# Patient Record
Sex: MALE | Race: WHITE | Employment: FULL TIME
[De-identification: names, ages, dates, MRNs, and addresses within clinical notes are randomized per-mention and may not be internally consistent; named-entity substitution may affect disease eponyms.]

---

## 2017-01-18 ENCOUNTER — OFFICE VISIT (OUTPATIENT)
Dept: FAMILY MEDICINE CLINIC | Facility: CLINIC | Age: 22
End: 2017-01-18

## 2017-01-18 VITALS
WEIGHT: 188 LBS | SYSTOLIC BLOOD PRESSURE: 116 MMHG | RESPIRATION RATE: 14 BRPM | BODY MASS INDEX: 27.85 KG/M2 | HEART RATE: 72 BPM | DIASTOLIC BLOOD PRESSURE: 68 MMHG | HEIGHT: 69 IN

## 2017-01-18 DIAGNOSIS — F33.9 EPISODE OF RECURRENT MAJOR DEPRESSIVE DISORDER, UNSPECIFIED DEPRESSION EPISODE SEVERITY (HCC): Primary | ICD-10-CM

## 2017-01-18 DIAGNOSIS — F41.0 PANIC ATTACKS: ICD-10-CM

## 2017-01-18 DIAGNOSIS — F12.90 MARIJUANA USE: ICD-10-CM

## 2017-01-18 PROCEDURE — 99213 OFFICE O/P EST LOW 20 MIN: CPT | Performed by: FAMILY MEDICINE

## 2017-01-18 RX ORDER — BUSPIRONE HYDROCHLORIDE 15 MG/1
15 TABLET ORAL 3 TIMES DAILY
Qty: 90 TABLET | Refills: 5 | Status: SHIPPED | OUTPATIENT
Start: 2017-01-18 | End: 2017-09-27 | Stop reason: SDUPTHER

## 2017-01-18 RX ORDER — CITALOPRAM 40 MG/1
40 TABLET ORAL DAILY
Qty: 30 TABLET | Refills: 5 | Status: SHIPPED | OUTPATIENT
Start: 2017-01-18 | End: 2017-09-27 | Stop reason: SDUPTHER

## 2017-01-18 ASSESSMENT — ENCOUNTER SYMPTOMS
ABDOMINAL PAIN: 0
GASTROINTESTINAL NEGATIVE: 1
VOMITING: 0
SORE THROAT: 0
CONSTIPATION: 0
EYE REDNESS: 0
APNEA: 0
DIARRHEA: 0
ABDOMINAL DISTENTION: 0
EYE DISCHARGE: 0
WHEEZING: 0
EYE ITCHING: 0
RHINORRHEA: 0
EYE PAIN: 0
RESPIRATORY NEGATIVE: 1
PHOTOPHOBIA: 0
BACK PAIN: 0
ALLERGIC/IMMUNOLOGIC NEGATIVE: 1
NAUSEA: 0
VOICE CHANGE: 0
FACIAL SWELLING: 0
EYES NEGATIVE: 1
COLOR CHANGE: 0
CHEST TIGHTNESS: 0
SHORTNESS OF BREATH: 0
COUGH: 0
CHOKING: 0
RECTAL PAIN: 0
BLOOD IN STOOL: 0
ANAL BLEEDING: 0
TROUBLE SWALLOWING: 0
SINUS PRESSURE: 0
STRIDOR: 0

## 2017-09-27 ENCOUNTER — OFFICE VISIT (OUTPATIENT)
Dept: FAMILY MEDICINE CLINIC | Age: 22
End: 2017-09-27

## 2017-09-27 VITALS
HEIGHT: 69 IN | SYSTOLIC BLOOD PRESSURE: 126 MMHG | BODY MASS INDEX: 30.66 KG/M2 | DIASTOLIC BLOOD PRESSURE: 74 MMHG | WEIGHT: 207 LBS | HEART RATE: 75 BPM | RESPIRATION RATE: 18 BRPM

## 2017-09-27 DIAGNOSIS — F41.0 PANIC ATTACKS: ICD-10-CM

## 2017-09-27 DIAGNOSIS — F33.9 EPISODE OF RECURRENT MAJOR DEPRESSIVE DISORDER, UNSPECIFIED DEPRESSION EPISODE SEVERITY (HCC): Primary | ICD-10-CM

## 2017-09-27 PROCEDURE — 99213 OFFICE O/P EST LOW 20 MIN: CPT | Performed by: FAMILY MEDICINE

## 2017-09-27 RX ORDER — BUSPIRONE HYDROCHLORIDE 15 MG/1
15 TABLET ORAL 3 TIMES DAILY
Qty: 270 TABLET | Refills: 2 | Status: SHIPPED | OUTPATIENT
Start: 2017-09-27 | End: 2020-12-08

## 2017-09-27 RX ORDER — CITALOPRAM 40 MG/1
40 TABLET ORAL DAILY
Qty: 90 TABLET | Refills: 2 | Status: SHIPPED | OUTPATIENT
Start: 2017-09-27 | End: 2020-12-08 | Stop reason: SDUPTHER

## 2017-09-28 ASSESSMENT — ENCOUNTER SYMPTOMS
RHINORRHEA: 0
ALLERGIC/IMMUNOLOGIC NEGATIVE: 1
FACIAL SWELLING: 0
EYE REDNESS: 0
COUGH: 0
STRIDOR: 0
EYE DISCHARGE: 0
EYES NEGATIVE: 1
DIARRHEA: 0
WHEEZING: 0
COLOR CHANGE: 0
PHOTOPHOBIA: 0
ABDOMINAL PAIN: 0
VOMITING: 0
CHEST TIGHTNESS: 0
SINUS PRESSURE: 0
BACK PAIN: 0
SHORTNESS OF BREATH: 0
EYE ITCHING: 0
EYE PAIN: 0
CHOKING: 0
NAUSEA: 0
VOICE CHANGE: 0
ABDOMINAL DISTENTION: 0
GASTROINTESTINAL NEGATIVE: 1
CONSTIPATION: 0
ANAL BLEEDING: 0
RESPIRATORY NEGATIVE: 1
BLOOD IN STOOL: 0
SORE THROAT: 0
APNEA: 0
TROUBLE SWALLOWING: 0
RECTAL PAIN: 0

## 2020-06-25 ENCOUNTER — OFFICE VISIT (OUTPATIENT)
Dept: SURGERY | Age: 25
End: 2020-06-25
Payer: MEDICAID

## 2020-06-25 ENCOUNTER — OFFICE VISIT (OUTPATIENT)
Dept: FAMILY MEDICINE CLINIC | Age: 25
End: 2020-06-25
Payer: MEDICAID

## 2020-06-25 VITALS
WEIGHT: 219 LBS | BODY MASS INDEX: 32.44 KG/M2 | TEMPERATURE: 96.4 F | SYSTOLIC BLOOD PRESSURE: 150 MMHG | HEIGHT: 69 IN | RESPIRATION RATE: 16 BRPM | HEART RATE: 100 BPM | DIASTOLIC BLOOD PRESSURE: 98 MMHG

## 2020-06-25 VITALS
BODY MASS INDEX: 33.41 KG/M2 | RESPIRATION RATE: 18 BRPM | SYSTOLIC BLOOD PRESSURE: 137 MMHG | DIASTOLIC BLOOD PRESSURE: 96 MMHG | HEIGHT: 69 IN | HEART RATE: 93 BPM | WEIGHT: 225.6 LBS | OXYGEN SATURATION: 98 %

## 2020-06-25 PROCEDURE — 4004F PT TOBACCO SCREEN RCVD TLK: CPT | Performed by: SURGERY

## 2020-06-25 PROCEDURE — 4004F PT TOBACCO SCREEN RCVD TLK: CPT | Performed by: INTERNAL MEDICINE

## 2020-06-25 PROCEDURE — G8427 DOCREV CUR MEDS BY ELIG CLIN: HCPCS | Performed by: SURGERY

## 2020-06-25 PROCEDURE — 96160 PT-FOCUSED HLTH RISK ASSMT: CPT | Performed by: INTERNAL MEDICINE

## 2020-06-25 PROCEDURE — G8417 CALC BMI ABV UP PARAM F/U: HCPCS | Performed by: INTERNAL MEDICINE

## 2020-06-25 PROCEDURE — G8427 DOCREV CUR MEDS BY ELIG CLIN: HCPCS | Performed by: INTERNAL MEDICINE

## 2020-06-25 PROCEDURE — G8417 CALC BMI ABV UP PARAM F/U: HCPCS | Performed by: SURGERY

## 2020-06-25 PROCEDURE — 99204 OFFICE O/P NEW MOD 45 MIN: CPT | Performed by: INTERNAL MEDICINE

## 2020-06-25 PROCEDURE — 99202 OFFICE O/P NEW SF 15 MIN: CPT | Performed by: SURGERY

## 2020-06-25 RX ORDER — OMEPRAZOLE 20 MG/1
20 CAPSULE, DELAYED RELEASE ORAL
Qty: 180 CAPSULE | Refills: 1 | Status: SHIPPED | OUTPATIENT
Start: 2020-06-25 | End: 2021-04-26 | Stop reason: SDUPTHER

## 2020-06-25 RX ORDER — SUCRALFATE 1 G/1
1 TABLET ORAL 4 TIMES DAILY
Qty: 120 TABLET | Refills: 3 | Status: SHIPPED | OUTPATIENT
Start: 2020-06-25 | End: 2020-12-08 | Stop reason: ALTCHOICE

## 2020-06-25 RX ORDER — LISINOPRIL 5 MG/1
5 TABLET ORAL DAILY
Qty: 30 TABLET | Refills: 3 | Status: SHIPPED | OUTPATIENT
Start: 2020-06-25 | End: 2020-12-08 | Stop reason: ALTCHOICE

## 2020-06-25 SDOH — ECONOMIC STABILITY: INCOME INSECURITY: HOW HARD IS IT FOR YOU TO PAY FOR THE VERY BASICS LIKE FOOD, HOUSING, MEDICAL CARE, AND HEATING?: NOT HARD AT ALL

## 2020-06-25 SDOH — ECONOMIC STABILITY: TRANSPORTATION INSECURITY
IN THE PAST 12 MONTHS, HAS THE LACK OF TRANSPORTATION KEPT YOU FROM MEDICAL APPOINTMENTS OR FROM GETTING MEDICATIONS?: NO

## 2020-06-25 SDOH — ECONOMIC STABILITY: TRANSPORTATION INSECURITY
IN THE PAST 12 MONTHS, HAS LACK OF TRANSPORTATION KEPT YOU FROM MEETINGS, WORK, OR FROM GETTING THINGS NEEDED FOR DAILY LIVING?: NO

## 2020-06-25 SDOH — ECONOMIC STABILITY: FOOD INSECURITY: WITHIN THE PAST 12 MONTHS, THE FOOD YOU BOUGHT JUST DIDN'T LAST AND YOU DIDN'T HAVE MONEY TO GET MORE.: NEVER TRUE

## 2020-06-25 SDOH — ECONOMIC STABILITY: FOOD INSECURITY: WITHIN THE PAST 12 MONTHS, YOU WORRIED THAT YOUR FOOD WOULD RUN OUT BEFORE YOU GOT MONEY TO BUY MORE.: NEVER TRUE

## 2020-06-25 ASSESSMENT — ENCOUNTER SYMPTOMS
CONSTIPATION: 0
SINUS PRESSURE: 0
DIARRHEA: 1
SINUS PAIN: 0
WHEEZING: 0
SORE THROAT: 0
FLATUS: 1
BLOOD IN STOOL: 0
COUGH: 0
RHINORRHEA: 0
BLURRED VISION: 0
ALLERGIC/IMMUNOLOGIC NEGATIVE: 1
NAUSEA: 1
ABDOMINAL PAIN: 1
VOMITING: 1
BELCHING: 0
SHORTNESS OF BREATH: 0

## 2020-06-25 ASSESSMENT — PATIENT HEALTH QUESTIONNAIRE - PHQ9
6. FEELING BAD ABOUT YOURSELF - OR THAT YOU ARE A FAILURE OR HAVE LET YOURSELF OR YOUR FAMILY DOWN: 0
8. MOVING OR SPEAKING SO SLOWLY THAT OTHER PEOPLE COULD HAVE NOTICED. OR THE OPPOSITE, BEING SO FIGETY OR RESTLESS THAT YOU HAVE BEEN MOVING AROUND A LOT MORE THAN USUAL: 0
SUM OF ALL RESPONSES TO PHQ QUESTIONS 1-9: 8
7. TROUBLE CONCENTRATING ON THINGS, SUCH AS READING THE NEWSPAPER OR WATCHING TELEVISION: 0
10. IF YOU CHECKED OFF ANY PROBLEMS, HOW DIFFICULT HAVE THESE PROBLEMS MADE IT FOR YOU TO DO YOUR WORK, TAKE CARE OF THINGS AT HOME, OR GET ALONG WITH OTHER PEOPLE: 3
4. FEELING TIRED OR HAVING LITTLE ENERGY: 0
SUM OF ALL RESPONSES TO PHQ9 QUESTIONS 1 & 2: 3
3. TROUBLE FALLING OR STAYING ASLEEP: 2
1. LITTLE INTEREST OR PLEASURE IN DOING THINGS: 2
2. FEELING DOWN, DEPRESSED OR HOPELESS: 1
9. THOUGHTS THAT YOU WOULD BE BETTER OFF DEAD, OR OF HURTING YOURSELF: 0
5. POOR APPETITE OR OVEREATING: 3
SUM OF ALL RESPONSES TO PHQ QUESTIONS 1-9: 8

## 2020-06-25 NOTE — LETTER
[address] [id number]  Phone: [phone]  Fax: [phone]    Troy Ya MD        July 18, 2020     Eva Andrea MD  [facility] [place] [id number]    Patient: Nina Coelho  MR Number: [id number]  YOB: 1995  Date of Visit: 6/25/2020    Dear Dr. Eva Andrea:    Thank you for the request for consultation for Mercedez Powers to me for the evaluation of epigastric pain, nausea. Below are the relevant portions of my assessment and plan of care. ASSESSMENT      Diagnosis Orders   1. Epigastric pain      2. Nausea      3. BMI 32.0-32.9,adult      4. Tobacco abuse      5. Marijuana use      6. History of renal calculi           PLAN     Discussed at length with Mr Marquez Huston his long history of heartburn. It was previously well managed with Nexium. He has just resumed PPI. Rx Carafate provided. I expect his symptoms to resolve. However, if his symptoms worsen, fail to resolve, or if new symptoms develop, he agrees to follow up with me to schedule EGD. Discussed importance of complete tobacco cessation, limiting marijuana use, healthy high fiber low fat diet, etc.    If you have questions, please do not hesitate to call me. I look forward to following Elan Talbert along with you.     Sincerely,        Troy Ya MD

## 2020-06-25 NOTE — PROGRESS NOTES
treatments include nothing. There is no history of kidney disease, CAD/MI, CVA, heart failure or left ventricular hypertrophy. Past Medical History:     Past Medical History:   Diagnosis Date    Anxiety     Depression     Kidney stone       Reviewed all health maintenance requirements and orderedappropriate tests  There are no preventive care reminders to display for this patient. Past Surgical History:     Past Surgical History:   Procedure Laterality Date    PILONIDAL CYST EXCISION      8 mos ago 2015        Medications:       Prior to Admission medications    Medication Sig Start Date End Date Taking? Authorizing Provider   omeprazole (PRILOSEC) 20 MG delayed release capsule Take 1 capsule by mouth 2 times daily (before meals) 6/25/20  Yes Meggan Davis MD   lisinopril (PRINIVIL;ZESTRIL) 5 MG tablet Take 1 tablet by mouth daily 6/25/20  Yes Meggan Davis MD   busPIRone (BUSPAR) 15 MG tablet Take 1 tablet by mouth 3 times daily  Patient not taking: Reported on 6/25/2020 9/27/17   Gab Springer MD   citalopram (CELEXA) 40 MG tablet Take 1 tablet by mouth daily  Patient not taking: Reported on 6/25/2020 9/27/17   Gab Springer MD        Allergies:       Patient has no known allergies. Social History:     Tobacco: reports that he has been smoking e-cigarettes and cigarettes. He has a 2.50 pack-year smoking history. He has never used smokeless tobacco.  Alcohol:      reports current alcohol use of about 5.0 standard drinks of alcohol per week. Drug Use:  reports no history of drug use. Family History:     History reviewed. No pertinent family history. Review of Systems:         Review of Systems   Constitutional: Negative for activity change, appetite change, fatigue, fever and unexpected weight change. HENT: Negative for congestion, ear discharge, ear pain, rhinorrhea, sinus pressure, sinus pain and sore throat. Eyes: Negative for blurred vision and visual disturbance. sounds. No wheezing or rales. Abdominal:      General: Bowel sounds are normal. There is no distension. Palpations: Abdomen is soft. There is no mass. Tenderness: There is abdominal tenderness (epigastric pain). There is no right CVA tenderness, left CVA tenderness or rebound. Hernia: No hernia is present. Musculoskeletal: Normal range of motion. Right lower leg: No edema. Left lower leg: No edema. Lymphadenopathy:      Cervical: No cervical adenopathy. Skin:     General: Skin is warm and dry. Coloration: Skin is not jaundiced or pale. Findings: No rash. Neurological:      General: No focal deficit present. Mental Status: He is alert and oriented to person, place, and time. Cranial Nerves: No cranial nerve deficit. Psychiatric:         Mood and Affect: Mood normal.         Behavior: Behavior normal.         Thought Content: Thought content normal.         Judgment: Judgment normal.               Data:     Lab Results   Component Value Date     06/01/2016    K 3.7 06/01/2016     06/01/2016    CO2 22 06/01/2016    BUN 14 06/01/2016    CREATININE 0.83 06/01/2016    GLUCOSE 101 06/01/2016    PROT 7.9 05/10/2015    LABALBU 5.2 05/10/2015    BILITOT 0.20 05/10/2015    ALKPHOS 73 05/10/2015    AST 16 05/10/2015    ALT 13 05/10/2015     Lab Results   Component Value Date    WBC 10.6 06/01/2016    RBC 4.76 06/01/2016    HGB 14.7 06/01/2016    HCT 43.8 06/01/2016    MCV 92.0 06/01/2016    MCH 30.9 06/01/2016    MCHC 33.6 06/01/2016    RDW 12.9 06/01/2016     06/01/2016    MPV NOT REPORTED 06/01/2016     No results found for: TSH  Lab Results   Component Value Date    LABA1C 5.1 05/08/2016          Assessment & Plan        Diagnosis Orders   1. Essential hypertension   Will start on Lisinopril 5 mg/day. Follow up in 4 weeks omeprazole (PRILOSEC) 20 MG delayed release capsule    lisinopril (PRINIVIL;ZESTRIL) 5 MG tablet   2.  Gastroesophageal reflux

## 2020-07-18 ASSESSMENT — ENCOUNTER SYMPTOMS
SHORTNESS OF BREATH: 0
SORE THROAT: 0
CHOKING: 0
TROUBLE SWALLOWING: 0
BLOOD IN STOOL: 0
VOMITING: 0
NAUSEA: 1
COUGH: 0
BACK PAIN: 1
ABDOMINAL PAIN: 1

## 2020-07-18 NOTE — PROGRESS NOTES
Kayleen Rodriguez MD  General Surgery, Endoscopy  Chief Medical Officer    [facility]  [address] [place] [address]  Dept: [phone]  Fax: 13 [address]  Chief Complaint   Patient presents with    New Patient    Abdominal Pain    Gastroesophageal Reflux     Patient requested appointment for abdominal pain occuring over the past month. Patient also complains of nausea, loose stools and gerd. Dr Connie Cardenas wrote for omeprazole today. HPI    Mr Sallie Cloud is a 32 yo WM kindly referred to me by Dr Connie Cardenas for evaluation of epigastric pain, nausea, with reflux symptoms. He has had heartburn for years. This was well controlled by Nexium over the last year, but he stopped taking it 2 months ago. At that time, symptoms returned. Nauseated, without emesis. Weight 219 lbs, stable. BMI 32.3. Daily BM's, formed and brown. History R ureteral stone in 2016, anxiety, depression. No prior GI surgery nor endoscopy. No family history of GI malignancy to his knowledge. He is decreasing tobacco use, less than 1 ppd. Occasional marijuana. Review of Systems   Constitutional: Negative for activity change, appetite change, chills, fever and unexpected weight change. HENT: Negative for nosebleeds, sneezing, sore throat and trouble swallowing. Eyes: Negative for visual disturbance. Respiratory: Negative for cough, choking and shortness of breath. Cardiovascular: Negative for chest pain, palpitations and leg swelling. Gastrointestinal: Positive for abdominal pain (epigastric, with GERD) and nausea. Negative for blood in stool and vomiting. Genitourinary: Negative for dysuria, flank pain and hematuria. Musculoskeletal: Positive for back pain. Negative for gait problem and myalgias. Allergic/Immunologic: Negative for immunocompromised state. Neurological: Positive for headaches. Negative for dizziness, seizures, syncope and weakness. Hematological: Does not bruise/bleed easily. Psychiatric/Behavioral: Negative for confusion and sleep disturbance. Past Medical History:   Diagnosis Date    Anxiety     Depression     Kidney stone        Past Surgical History:   Procedure Laterality Date    PILONIDAL CYST EXCISION      8 mos ago 2015       History reviewed. No pertinent family history. Allergies:  See list    Current Outpatient Medications   Medication Sig Dispense Refill    omeprazole (PRILOSEC) 20 MG delayed release capsule Take 1 capsule by mouth 2 times daily (before meals) 180 capsule 1    lisinopril (PRINIVIL;ZESTRIL) 5 MG tablet Take 1 tablet by mouth daily 30 tablet 3    sucralfate (CARAFATE) 1 GM tablet Take 1 tablet by mouth 4 times daily 120 tablet 3    busPIRone (BUSPAR) 15 MG tablet Take 1 tablet by mouth 3 times daily 270 tablet 2    citalopram (CELEXA) 40 MG tablet Take 1 tablet by mouth daily 90 tablet 2     No current facility-administered medications for this visit. Social History     Socioeconomic History    Marital status: Single     Spouse name: None    Number of children: None    Years of education: None    Highest education level: None   Occupational History    None   Social Needs    Financial resource strain: Not hard at all   Justin-Brandon insecurity     Worry: Never true     Inability: Never true    Transportation needs     Medical: No     Non-medical: No   Tobacco Use    Smoking status: Current Some Day Smoker     Packs/day: 0.50     Years: 5.00     Pack years: 2.50     Types: E-Cigarettes, Cigarettes    Smokeless tobacco: Never Used   Substance and Sexual Activity    Alcohol use:  Yes     Alcohol/week: 5.0 standard drinks     Types: 5 Glasses of wine per week     Comment: occasional    Drug use: No    Sexual activity: None   Lifestyle    Physical activity     Days per week: None     Minutes per session: None    Stress: None   Relationships    Social connections     Talks on phone: None Gets together: None     Attends Hoahaoism service: None     Active member of club or organization: None     Attends meetings of clubs or organizations: None     Relationship status: None    Intimate partner violence     Fear of current or ex partner: None     Emotionally abused: None     Physically abused: None     Forced sexual activity: None   Other Topics Concern    None   Social History Narrative    None       BP (!) 150/98 (Site: Right Lower Arm, Position: Sitting, Cuff Size: Medium Adult)   Pulse 100   Temp 96.4 °F (35.8 °C) (Infrared)   Resp 16   Ht 5' 9\" (1.753 m)   Wt 219 lb (99.3 kg)   BMI 32.34 kg/m²      Physical Exam  Vitals signs and nursing note reviewed. Constitutional:       Appearance: He is well-developed. HENT:      Head: Normocephalic and atraumatic. Eyes:      General: No scleral icterus. Conjunctiva/sclera: Conjunctivae normal.      Pupils: Pupils are equal, round, and reactive to light. Neck:      Musculoskeletal: Normal range of motion and neck supple. Vascular: No JVD. Trachea: No tracheal deviation. Cardiovascular:      Rate and Rhythm: Normal rate and regular rhythm. Pulmonary:      Effort: Pulmonary effort is normal. No respiratory distress. Breath sounds: Normal breath sounds. Chest:      Chest wall: No tenderness. Abdominal:      General: Bowel sounds are normal. There is no distension. Palpations: Abdomen is soft. There is no mass. Tenderness: There is no abdominal tenderness. There is no guarding or rebound. Musculoskeletal: Normal range of motion. Lymphadenopathy:      Cervical: No cervical adenopathy. Skin:     General: Skin is warm and dry. Findings: No erythema or rash. Neurological:      Mental Status: He is alert and oriented to person, place, and time. Cranial Nerves: No cranial nerve deficit. Psychiatric:         Behavior: Behavior normal.         Thought Content:  Thought content normal. findings in the    abdomen or pelvis.               ASSESSMENT     Diagnosis Orders   1. Epigastric pain     2. Nausea     3. BMI 32.0-32.9,adult     4. Tobacco abuse     5. Marijuana use     6. History of renal calculi         PLAN    Discussed at length with Mr Sallie Cloud his long history of heartburn. It was previously well managed with Nexium. He has just resumed PPI. Rx Carafate provided. I expect his symptoms to resolve. However, if his symptoms worsen, fail to resolve, or if new symptoms develop, he agrees to follow up with me to schedule EGD.   Discussed importance of complete tobacco cessation, limiting marijuana use, healthy high fiber low fat diet, etc.     Kayleen Rodriguez MD

## 2020-07-18 NOTE — PATIENT INSTRUCTIONS
scope in your mouth and toward the back of your throat. The doctor will tell you when to swallow. This helps the scope move down your throat. You will be able to breathe normally. The doctor will move the scope down your esophagus into your stomach. The doctor also may look at the duodenum. · If your doctor wants to take a sample of tissue for a biopsy, he or she may use small surgical tools, which are put into the scope, to cut off some tissue. You will not feel a biopsy, if one is taken. The doctor also can use the tools to stop bleeding or to do other treatments, if needed. · You will stay at the hospital or surgery center for 1 to 2 hours until the medicine you were given wears off. What happens after an upper GI endoscopy? · After the test, you may belch and feel bloated for a while. · You may have a tickling, dry throat or mouth. You may feel a bit hoarse, and you may have a mild sore throat. These symptoms may last several days. Throat lozenges and warm saltwater gargles can help relieve the throat symptoms. · Don't drive or operate machinery for 12 hours after the test.  · Your doctor will tell you when you can go back to your usual diet and activities. · Don't drink alcohol for 12 to 24 hours after the test.  When should you call your doctor? · You have questions or concerns. · You don't understand how to prepare for your procedure. · You become ill before the procedure (such as fever, flu, or a cold). · You need to reschedule or have changed your mind about having the procedure. Where can you learn more? Go to [email or web address] and sign in to your [employer] account. Enter P790 in the Pontis box to learn more about \"Upper GI Endoscopy: Before Your Procedure. \"     If you do not have an account, please click on the \"Sign Up Now\" link. Current as of: August 12, 2019               Content Version: 12.5  © 3974-2686 Healthwise, [facility].    Care instructions adapted under license by [facility] ([facility]). If you have questions about a medical condition or this instruction, always ask your healthcare professional. [address] any warranty or liability for your use of this information.

## 2020-12-08 ENCOUNTER — OFFICE VISIT (OUTPATIENT)
Dept: FAMILY MEDICINE CLINIC | Age: 25
End: 2020-12-08
Payer: MEDICAID

## 2020-12-08 VITALS
BODY MASS INDEX: 36.24 KG/M2 | DIASTOLIC BLOOD PRESSURE: 106 MMHG | OXYGEN SATURATION: 100 % | WEIGHT: 245.4 LBS | HEART RATE: 117 BPM | SYSTOLIC BLOOD PRESSURE: 159 MMHG | TEMPERATURE: 98 F

## 2020-12-08 PROBLEM — F41.9 ANXIETY: Status: ACTIVE | Noted: 2020-12-08

## 2020-12-08 PROBLEM — K21.9 GASTROESOPHAGEAL REFLUX DISEASE WITHOUT ESOPHAGITIS: Status: ACTIVE | Noted: 2020-12-08

## 2020-12-08 PROBLEM — F12.90 MARIJUANA USE: Status: RESOLVED | Noted: 2017-01-18 | Resolved: 2020-12-08

## 2020-12-08 PROBLEM — I10 ESSENTIAL HYPERTENSION: Status: ACTIVE | Noted: 2020-12-08

## 2020-12-08 PROCEDURE — G8427 DOCREV CUR MEDS BY ELIG CLIN: HCPCS | Performed by: INTERNAL MEDICINE

## 2020-12-08 PROCEDURE — G8484 FLU IMMUNIZE NO ADMIN: HCPCS | Performed by: INTERNAL MEDICINE

## 2020-12-08 PROCEDURE — G8417 CALC BMI ABV UP PARAM F/U: HCPCS | Performed by: INTERNAL MEDICINE

## 2020-12-08 PROCEDURE — 99214 OFFICE O/P EST MOD 30 MIN: CPT | Performed by: INTERNAL MEDICINE

## 2020-12-08 PROCEDURE — 4004F PT TOBACCO SCREEN RCVD TLK: CPT | Performed by: INTERNAL MEDICINE

## 2020-12-08 RX ORDER — CITALOPRAM 40 MG/1
40 TABLET ORAL DAILY
Qty: 90 TABLET | Refills: 2 | Status: SHIPPED | OUTPATIENT
Start: 2020-12-08 | End: 2021-08-02

## 2020-12-08 ASSESSMENT — ENCOUNTER SYMPTOMS
BACK PAIN: 0
CONSTIPATION: 0
COUGH: 0
SHORTNESS OF BREATH: 1
SORE THROAT: 0
BLOOD IN STOOL: 0
NAUSEA: 0
ALLERGIC/IMMUNOLOGIC NEGATIVE: 1
ABDOMINAL PAIN: 0
WHEEZING: 0

## 2020-12-08 ASSESSMENT — COPD QUESTIONNAIRES: COPD: 0

## 2020-12-08 NOTE — PATIENT INSTRUCTIONS
SURVEY:    You may be receiving a survey from [employer] regarding your visit today. Please complete the survey to enable us to provide the highest quality of care to you and your family. If you cannot score us a very good on any question, please call the office to discuss how we could of made your experience a very good one. Thank you. You may be receiving a survey from [employer] regarding your visit today. You may get this in the mail, through your MyChart or in your email. Please complete the survey to enable us to provide the highest quality of care to you and your family. If you cannot score us as very good ( 5 Stars) on any question, please feel free to call the office to discuss how we could have made your experience exceptional.     Thank You!       MD Ivan Ramon, ROMINA Delgado, PCA

## 2020-12-08 NOTE — PROGRESS NOTES
8 mos ago 2015        Medications:       Prior to Admission medications    Medication Sig Start Date End Date Taking? Authorizing Provider   metoprolol tartrate (LOPRESSOR) 25 MG tablet Take 1 tablet by mouth 2 times daily 12/8/20  Yes Yvrose Hawk MD   citalopram (CELEXA) 40 MG tablet Take 1 tablet by mouth daily 12/8/20  Yes Yvrose Hawk MD   omeprazole (PRILOSEC) 20 MG delayed release capsule Take 1 capsule by mouth 2 times daily (before meals) 6/25/20   Yvrose Hawk MD        Allergies:       Patient has no known allergies. Social History:     Tobacco: reports that he has been smoking e-cigarettes and cigarettes. He has a 2.50 pack-year smoking history. He has never used smokeless tobacco.  Alcohol:      reports current alcohol use of about 5.0 standard drinks of alcohol per week. Drug Use:  reports no history of drug use. Family History:     No family history on file. Review of Systems:         Review of Systems   Constitutional: Positive for fatigue. Negative for activity change, appetite change and unexpected weight change. HENT: Negative for congestion, ear discharge, ear pain and sore throat. Eyes: Negative for visual disturbance. Respiratory: Positive for shortness of breath. Negative for cough and wheezing. Cardiovascular: Positive for chest pain and palpitations. Negative for leg swelling. Gastrointestinal: Negative for abdominal pain, blood in stool, constipation and nausea. Endocrine: Negative for cold intolerance, heat intolerance, polydipsia and polyuria. Genitourinary: Negative for difficulty urinating and dysuria. Musculoskeletal: Positive for neck pain. Negative for back pain and neck stiffness. Skin: Negative for rash. Allergic/Immunologic: Negative. Neurological: Positive for headaches. Negative for dizziness, tremors, syncope and weakness. Psychiatric/Behavioral: Positive for sleep disturbance. Negative for behavioral problems and dysphoric mood. The patient is nervous/anxious. Physical Exam:     Vitals:  BP (!) 159/106 (Site: Right Upper Arm, Position: Sitting, Cuff Size: Large Adult)   Pulse 117   Temp 98 °F (36.7 °C)   Wt 245 lb 6.4 oz (111.3 kg)   SpO2 100%   BMI 36.24 kg/m²       Physical Exam  Vitals signs reviewed. Constitutional:       General: He is not in acute distress. Appearance: He is well-developed. He is obese. He is not ill-appearing or diaphoretic. HENT:      Head: Normocephalic and atraumatic. Right Ear: Tympanic membrane, ear canal and external ear normal.      Left Ear: Tympanic membrane, ear canal and external ear normal.      Nose: Nose normal. No congestion. Mouth/Throat:      Mouth: Mucous membranes are moist.      Pharynx: Oropharynx is clear. No oropharyngeal exudate. Eyes:      General: No scleral icterus. Right eye: No discharge. Left eye: No discharge. Conjunctiva/sclera: Conjunctivae normal.   Neck:      Musculoskeletal: Normal range of motion. No muscular tenderness. Thyroid: No thyromegaly. Cardiovascular:      Rate and Rhythm: Regular rhythm. Tachycardia present. Pulses: Normal pulses. Heart sounds: Normal heart sounds. No murmur. Pulmonary:      Effort: Pulmonary effort is normal.      Breath sounds: Normal breath sounds. No wheezing or rales. Abdominal:      General: Bowel sounds are normal. There is no distension. Palpations: Abdomen is soft. There is no mass. Tenderness: There is no abdominal tenderness. Musculoskeletal: Normal range of motion. General: No tenderness. Right lower leg: No edema. Left lower leg: No edema. Lymphadenopathy:      Cervical: No cervical adenopathy. Skin:     General: Skin is warm and dry. Findings: No rash. Neurological:      General: No focal deficit present. Mental Status: He is alert and oriented to person, place, and time. Mental status is at baseline.    Psychiatric: Mood and Affect: Mood normal.         Behavior: Behavior normal.         Thought Content: Thought content normal.         Judgment: Judgment normal.               Data:     Lab Results   Component Value Date     06/01/2016    K 3.7 06/01/2016     06/01/2016    CO2 22 06/01/2016    BUN 14 06/01/2016    CREATININE 0.83 06/01/2016    GLUCOSE 101 06/01/2016    PROT 7.9 05/10/2015    LABALBU 5.2 05/10/2015    BILITOT 0.20 05/10/2015    ALKPHOS 73 05/10/2015    AST 16 05/10/2015    ALT 13 05/10/2015     Lab Results   Component Value Date    WBC 10.6 06/01/2016    RBC 4.76 06/01/2016    HGB 14.7 06/01/2016    HCT 43.8 06/01/2016    MCV 92.0 06/01/2016    MCH 30.9 06/01/2016    MCHC 33.6 06/01/2016    RDW 12.9 06/01/2016     06/01/2016    MPV NOT REPORTED 06/01/2016     No results found for: TSH  Lab Results   Component Value Date    LABA1C 5.1 05/08/2016          Assessment & Plan        Diagnosis Orders   1. Essential hypertension   BP uncontrolled, pt noncompliant with medical therapy. Will start on Lopressor 25 mg bid due to tachycardia and CP. Follow up in about 2 weeks metoprolol tartrate (LOPRESSOR) 25 MG tablet   2. Chest pain, unspecified type   Will check EKG, labs. Will consider stress test after EKG available. Comprehensive Metabolic Panel    Troponin I    EKG 12 lead    CBC Auto Differential   3. Anxiety   Will check TSH. Resume Celexa. Follow up in 2 weeks for reevaluation  citalopram (CELEXA) 40 MG tablet    TSH with Reflex                   Completed Refills   Requested Prescriptions     Signed Prescriptions Disp Refills    metoprolol tartrate (LOPRESSOR) 25 MG tablet 180 tablet 1     Sig: Take 1 tablet by mouth 2 times daily    citalopram (CELEXA) 40 MG tablet 90 tablet 2     Sig: Take 1 tablet by mouth daily     Return in about 2 weeks (around 12/22/2020) for HTN.      Orders Placed This Encounter   Medications    metoprolol tartrate (LOPRESSOR) 25 MG tablet     Sig: Take 1 (CELEXA) 40 MG tablet 90 tablet 2     Sig: Take 1 tablet by mouth daily

## 2020-12-10 ENCOUNTER — TELEPHONE (OUTPATIENT)
Dept: FAMILY MEDICINE CLINIC | Age: 25
End: 2020-12-10

## 2020-12-10 LAB
ALBUMIN SERPL-MCNC: 4.2 G/DL
ALP BLD-CCNC: 117 U/L
ALT SERPL-CCNC: 60 U/L
ANION GAP SERPL CALCULATED.3IONS-SCNC: 11 MMOL/L
AST SERPL-CCNC: 38 U/L
BASOPHILS ABSOLUTE: 0.03 /ΜL
BASOPHILS RELATIVE PERCENT: 0.3 %
BILIRUB SERPL-MCNC: 0.4 MG/DL (ref 0.1–1.4)
BUN BLDV-MCNC: 18 MG/DL
CALCIUM SERPL-MCNC: 9.5 MG/DL
CHLORIDE BLD-SCNC: 103 MMOL/L
CO2: 27 MMOL/L
CREAT SERPL-MCNC: 1.3 MG/DL
EOSINOPHILS ABSOLUTE: 0.08 /ΜL
EOSINOPHILS RELATIVE PERCENT: 0.8 %
GFR CALCULATED: 75
GLUCOSE BLD-MCNC: 94 MG/DL
HCT VFR BLD CALC: 39.9 % (ref 41–53)
HEMOGLOBIN: 14 G/DL (ref 13.5–17.5)
LYMPHOCYTES ABSOLUTE: 2.99 /ΜL
LYMPHOCYTES RELATIVE PERCENT: 29.7 %
MCH RBC QN AUTO: 30 PG
MCHC RBC AUTO-ENTMCNC: 35.1 G/DL
MCV RBC AUTO: 85.4 FL
MONOCYTES ABSOLUTE: 0.91 /ΜL
MONOCYTES RELATIVE PERCENT: 9 %
NEUTROPHILS ABSOLUTE: 6.06 /ΜL
NEUTROPHILS RELATIVE PERCENT: 60.1 %
PDW BLD-RTO: 11.9 %
PLATELET # BLD: 283 K/ΜL
PMV BLD AUTO: 11.6 FL
POTASSIUM SERPL-SCNC: 3.9 MMOL/L
RBC # BLD: 4.67 10^6/ΜL
SODIUM BLD-SCNC: 137 MMOL/L
TOTAL PROTEIN: 8.8
TROPONIN: <15
TSH SERPL DL<=0.05 MIU/L-ACNC: 1.58 UIU/ML
WBC # BLD: 10.08 10^3/ML

## 2020-12-11 ENCOUNTER — TELEPHONE (OUTPATIENT)
Dept: FAMILY MEDICINE CLINIC | Age: 25
End: 2020-12-11

## 2020-12-16 ENCOUNTER — TELEPHONE (OUTPATIENT)
Dept: OBGYN CLINIC | Age: 25
End: 2020-12-16

## 2021-04-26 ENCOUNTER — OFFICE VISIT (OUTPATIENT)
Dept: FAMILY MEDICINE CLINIC | Age: 26
End: 2021-04-26
Payer: MEDICAID

## 2021-04-26 VITALS
TEMPERATURE: 97.2 F | DIASTOLIC BLOOD PRESSURE: 90 MMHG | OXYGEN SATURATION: 99 % | HEART RATE: 109 BPM | WEIGHT: 211.4 LBS | SYSTOLIC BLOOD PRESSURE: 164 MMHG | BODY MASS INDEX: 31.22 KG/M2

## 2021-04-26 DIAGNOSIS — R10.10 PAIN OF UPPER ABDOMEN: Primary | ICD-10-CM

## 2021-04-26 DIAGNOSIS — I10 ESSENTIAL HYPERTENSION: ICD-10-CM

## 2021-04-26 DIAGNOSIS — Z13.31 POSITIVE DEPRESSION SCREENING: ICD-10-CM

## 2021-04-26 DIAGNOSIS — F41.9 ANXIETY: ICD-10-CM

## 2021-04-26 DIAGNOSIS — K21.9 GASTROESOPHAGEAL REFLUX DISEASE WITHOUT ESOPHAGITIS: ICD-10-CM

## 2021-04-26 PROCEDURE — G8427 DOCREV CUR MEDS BY ELIG CLIN: HCPCS | Performed by: INTERNAL MEDICINE

## 2021-04-26 PROCEDURE — G8417 CALC BMI ABV UP PARAM F/U: HCPCS | Performed by: INTERNAL MEDICINE

## 2021-04-26 PROCEDURE — G8431 POS CLIN DEPRES SCRN F/U DOC: HCPCS | Performed by: INTERNAL MEDICINE

## 2021-04-26 PROCEDURE — 4004F PT TOBACCO SCREEN RCVD TLK: CPT | Performed by: INTERNAL MEDICINE

## 2021-04-26 PROCEDURE — 99214 OFFICE O/P EST MOD 30 MIN: CPT | Performed by: INTERNAL MEDICINE

## 2021-04-26 RX ORDER — DICYCLOMINE HCL 20 MG
TABLET ORAL
COMMUNITY
Start: 2021-04-19 | End: 2021-07-19

## 2021-04-26 RX ORDER — SUCRALFATE ORAL 1 G/10ML
1 SUSPENSION ORAL
COMMUNITY
Start: 2021-04-23 | End: 2021-05-17

## 2021-04-26 RX ORDER — BUSPIRONE HYDROCHLORIDE 10 MG/1
10 TABLET ORAL 3 TIMES DAILY
Qty: 90 TABLET | Refills: 3 | Status: SHIPPED | OUTPATIENT
Start: 2021-04-26 | End: 2021-05-17 | Stop reason: DRUGHIGH

## 2021-04-26 RX ORDER — ONDANSETRON 4 MG/1
TABLET, ORALLY DISINTEGRATING ORAL
COMMUNITY
Start: 2021-04-24 | End: 2021-07-19

## 2021-04-26 RX ORDER — POTASSIUM & SODIUM PHOSPHATES POWDER PACK 280-160-250 MG 280-160-250 MG
PACK ORAL
COMMUNITY
Start: 2021-04-20 | End: 2021-07-19

## 2021-04-26 RX ORDER — OMEPRAZOLE 40 MG/1
CAPSULE, DELAYED RELEASE ORAL
COMMUNITY
Start: 2021-04-19 | End: 2021-04-26

## 2021-04-26 RX ORDER — PROMETHAZINE HYDROCHLORIDE 25 MG/1
TABLET ORAL
COMMUNITY
Start: 2021-04-19 | End: 2021-07-19

## 2021-04-26 RX ORDER — OMEPRAZOLE 20 MG/1
20 CAPSULE, DELAYED RELEASE ORAL
Qty: 180 CAPSULE | Refills: 1 | Status: SHIPPED | OUTPATIENT
Start: 2021-04-26 | End: 2021-10-22

## 2021-04-26 ASSESSMENT — ENCOUNTER SYMPTOMS
VOMITING: 1
ALLERGIC/IMMUNOLOGIC NEGATIVE: 1
RECTAL PAIN: 0
CONSTIPATION: 0
RHINORRHEA: 0
SHORTNESS OF BREATH: 0
BLURRED VISION: 0
DIARRHEA: 1
ABDOMINAL PAIN: 1
ANAL BLEEDING: 0
CHEST TIGHTNESS: 0
HEMATOCHEZIA: 0
SORE THROAT: 0
NAUSEA: 1
COUGH: 0
ABDOMINAL DISTENTION: 0
BLOOD IN STOOL: 0
WHEEZING: 0

## 2021-04-26 ASSESSMENT — PATIENT HEALTH QUESTIONNAIRE - PHQ9
1. LITTLE INTEREST OR PLEASURE IN DOING THINGS: 3
3. TROUBLE FALLING OR STAYING ASLEEP: 3
4. FEELING TIRED OR HAVING LITTLE ENERGY: 3
SUM OF ALL RESPONSES TO PHQ9 QUESTIONS 1 & 2: 6
9. THOUGHTS THAT YOU WOULD BE BETTER OFF DEAD, OR OF HURTING YOURSELF: 0
SUM OF ALL RESPONSES TO PHQ QUESTIONS 1-9: 24
10. IF YOU CHECKED OFF ANY PROBLEMS, HOW DIFFICULT HAVE THESE PROBLEMS MADE IT FOR YOU TO DO YOUR WORK, TAKE CARE OF THINGS AT HOME, OR GET ALONG WITH OTHER PEOPLE: 3

## 2021-04-26 ASSESSMENT — COLUMBIA-SUICIDE SEVERITY RATING SCALE - C-SSRS: 1. WITHIN THE PAST MONTH, HAVE YOU WISHED YOU WERE DEAD OR WISHED YOU COULD GO TO SLEEP AND NOT WAKE UP?: NO

## 2021-04-26 ASSESSMENT — CROHNS DISEASE ACTIVITY INDEX (CDAI): CDAI SCORE: 0

## 2021-04-26 NOTE — PROGRESS NOTES
HPI Notes    Name: Courtney Hyman  : 1995         Chief Complaint:     Chief Complaint   Patient presents with    Anxiety     Patient states he has increased anxiety since 20 and his medication is no longer working. Patient states anxiety has been getting worse    Hypertension     Patient states his blood pressure and pulse has been elevated lately, has been in ER for this issue. History of Present Illness:        Mr. Christiano Lin presents to office for evaluation of abdominal pain. He also would like to follow-up for hypertension and anxiety      He went to [facility] emergency room 3 times in the last 2 weeks complaining of abdominal pain. His work-up including CT scan abdomen and pelvis were unremarkable. It was felt that his pain was related to gastritis. Was advised to take omeprazole and follow low-fat diet, avoid spicy food. According to the patient his abdominal pain was associated with nausea, vomiting and diarrhea. Apparently started about 2 weeks ago after multiple family members had similar symptoms. He reports no blood in stool. He had no blood in vomitus. He does not drink alcohol. His tried to use marijuana twice to help with N/V and anxiety and it made it worse. Soraya Eagle His last ER visit to [facility] ER was on . CT scan of abdomen and pelvis with IV contrast was done, revealed no acute intra-abdominal process, unremarkable appendix, no focal consolidation at the lung bases. Labs were unremarkable except slightly low phosphorus level of 2.6. CBC was normal with no leukocytosis and no evidence of anemia. D-dimer was 0.33. LFTs were unremarkable with normal transaminase levels and normal alk phos level of 106. Lipase was also normal 111. Potassium was mildly low at 3.4. Renal function was normal.  His EKG revealed sinus tachycardia with incomplete RBBB and moderate LVH. Patient was discharged home with Carafate. States pain is persisting. He has been complaint with diet. Kavon Segal presents as a follow up on anxiety. Current medication for anxiety includes Celexa. Kavon Segal has been on this medication for many years  . The medication is  being tolerated well. Since starting the medication the symptoms of anxiety have controlled but now not helping. Dewey Kahn  is not in counciling. He tried Buspar in the past and it helped him . Wants to try again       Abdominal Pain  This is a new problem. The current episode started 1 to 4 weeks ago. The onset quality is sudden. The problem occurs constantly. The problem has been unchanged. The pain is located in the epigastric region. The pain is at a severity of 8/10. The quality of the pain is colicky and sharp. The abdominal pain radiates to the periumbilical region. Associated symptoms include diarrhea, nausea and vomiting. Pertinent negatives include no constipation, dysuria, fever, headaches, hematochezia, melena or myalgias. The pain is aggravated by movement. The pain is relieved by nothing. He has tried proton pump inhibitors for the symptoms. The treatment provided mild relief. Prior diagnostic workup includes CT scan. There is no history of abdominal surgery, Crohn's disease, pancreatitis, PUD or ulcerative colitis. Hypertension  This is a chronic problem. The current episode started more than 1 year ago. The problem has been gradually worsening since onset. The problem is uncontrolled. Pertinent negatives include no blurred vision, chest pain, headaches, malaise/fatigue, palpitations, peripheral edema or shortness of breath. There are no associated agents to hypertension. Risk factors for coronary artery disease include obesity and male gender. Past treatments include beta blockers. There are no compliance problems. There is no history of kidney disease, CAD/MI or CVA.            Past Medical History:     Past Medical History:   Diagnosis Date    Anxiety     Depression     Kidney stone       Reviewed all health maintenance requirements and orderedappropriate tests  Health Maintenance Due   Topic Date Due    Hepatitis C screen  Never done    Varicella vaccine (1 of 2 - 2-dose childhood series) Never done    COVID-19 Vaccine (1) Never done       Past Surgical History:     Past Surgical History:   Procedure Laterality Date    PILONIDAL CYST EXCISION      8 mos ago 2015        Medications:       Prior to Admission medications    Medication Sig Start Date End Date Taking? Authorizing Provider   omeprazole (PRILOSEC) 20 MG delayed release capsule Take 1 capsule by mouth 2 times daily (before meals) 4/26/21  Yes Maryann Blake MD   busPIRone (BUSPAR) 10 MG tablet Take 1 tablet by mouth 3 times daily 4/26/21 5/26/21 Yes Maryann Blake MD   metoprolol tartrate (LOPRESSOR) 25 MG tablet Take 1 tablet by mouth 2 times daily 4/26/21  Yes Maryann Blake MD   citalopram (CELEXA) 40 MG tablet Take 1 tablet by mouth daily 12/8/20  Yes Maryann Blake MD   sucralfate (CARAFATE) 1 GM/10ML suspension Take 1 g by mouth 4/23/21 5/3/21  Historical Provider, MD Maryann Blake [phone] [facility] 21 PACK  4/20/21   Historical Provider, MD   ondansetron (ZOFRAN-ODT) 4 MG disintegrating tablet  4/24/21   Historical Provider, MD   promethazine (PHENERGAN) 25 MG tablet  4/19/21   Historical Provider, MD   dicyclomine (BENTYL) 20 MG tablet  4/19/21   Historical Provider, MD        Allergies:       Patient has no known allergies. Social History:     Tobacco: reports that he has been smoking e-cigarettes and cigarettes. He has a 2.50 pack-year smoking history. He has never used smokeless tobacco.  Alcohol:      reports current alcohol use of about 5.0 standard drinks of alcohol per week. Drug Use:  reports no history of drug use. Family History:     History reviewed. No pertinent family history.     Review of Systems:         Review of Systems   Constitutional: Negative for activity change, appetite change, fatigue, fever, malaise/fatigue and unexpected weight change. HENT: Negative for congestion, ear discharge, ear pain, rhinorrhea and sore throat. Eyes: Negative for blurred vision and visual disturbance. Respiratory: Negative for cough, chest tightness, shortness of breath and wheezing. Cardiovascular: Negative for chest pain, palpitations and leg swelling. Gastrointestinal: Positive for abdominal pain, diarrhea, nausea and vomiting. Negative for abdominal distention, anal bleeding, blood in stool, constipation, hematochezia, melena and rectal pain. Endocrine: Negative for cold intolerance, heat intolerance, polydipsia and polyuria. Genitourinary: Negative for difficulty urinating and dysuria. Musculoskeletal: Negative. Negative for myalgias. Skin: Negative for rash. Allergic/Immunologic: Negative. Neurological: Negative for dizziness, weakness and headaches. Psychiatric/Behavioral: Negative for behavioral problems, dysphoric mood, self-injury, sleep disturbance and suicidal ideas. The patient is nervous/anxious. Physical Exam:     Vitals:  BP (!) 164/90 (Site: Right Upper Arm, Position: Sitting, Cuff Size: Medium Adult)   Pulse 109   Temp 97.2 °F (36.2 °C)   Wt 211 lb 6.4 oz (95.9 kg)   SpO2 99%   BMI 31.22 kg/m²       Physical Exam  Vitals signs reviewed. Constitutional:       General: He is not in acute distress. Appearance: He is well-developed. He is obese. HENT:      Head: Normocephalic and atraumatic. Mouth/Throat:      Mouth: Mucous membranes are moist.      Pharynx: Oropharynx is clear. Eyes:      General: No scleral icterus. Right eye: No discharge. Left eye: No discharge. Conjunctiva/sclera: Conjunctivae normal.   Neck:      Thyroid: No thyromegaly. Cardiovascular:      Rate and Rhythm: Normal rate and regular rhythm. Heart sounds: Normal heart sounds. No murmur. Pulmonary:      Effort: Pulmonary effort is normal.      Breath sounds: Normal breath sounds. No wheezing or rales. Abdominal:      General: Bowel sounds are normal. There is no distension. Palpations: Abdomen is soft. There is no mass. Tenderness: There is no abdominal tenderness. There is no right CVA tenderness or left CVA tenderness. Hernia: No hernia is present. Musculoskeletal: Normal range of motion. Right lower leg: No edema. Left lower leg: No edema. Lymphadenopathy:      Cervical: No cervical adenopathy. Skin:     General: Skin is warm and dry. Coloration: Skin is not jaundiced or pale. Findings: No rash. Neurological:      General: No focal deficit present. Mental Status: He is alert and oriented to person, place, and time. Psychiatric:         Mood and Affect: Mood normal.         Behavior: Behavior normal.         Judgment: Judgment normal.      Comments: Anxious looking young man               Data:     Lab Results   Component Value Date     12/10/2020    K 3.9 12/10/2020     12/10/2020    CO2 27 12/10/2020    BUN 18 12/10/2020    CREATININE 1.30 12/10/2020    GLUCOSE 94 12/10/2020    PROT 7.9 05/10/2015    LABALBU 4.2 12/10/2020    BILITOT 0.4 12/10/2020    ALKPHOS 117 12/10/2020    AST 38 12/10/2020    ALT 60 12/10/2020     Lab Results   Component Value Date    WBC 10.08 12/10/2020    RBC 4.67 12/10/2020    HGB 14.0 12/10/2020    HCT 39.9 12/10/2020    MCV 85.4 12/10/2020    MCH 30.0 12/10/2020    MCHC 35.1 12/10/2020    RDW 11.9 12/10/2020     12/10/2020    MPV 11.6 12/10/2020     Lab Results   Component Value Date    TSH 1.58 12/10/2020     Lab Results   Component Value Date    LABA1C 5.1 05/08/2016          Assessment & Plan        Diagnosis Orders   1. Pain of upper abdomen   Patient continues to have persisting epigastric pain despite taking omeprazole and Carafate and trying to be more compliant with diet. Has associated nausea and occasional vomiting.   Has no history of gastric ulcers or GI problems in the past. Will refer to general surgery Tim Baer for evaluation for possible EGD Lionel Grullon MD, General Surgery, Lamberto Millan   2. Gastroesophageal reflux disease without esophagitis   Recommended to continue on PPIs and Carafate omeprazole (PRILOSEC) 20 MG delayed release capsule   3. Essential hypertension   Blood pressure is uncontrolled. According to the patient his pressure is elevated due to anxiety. Will address anxiety and follow-up with him in 2 weeks for a recheck omeprazole (PRILOSEC) 20 MG delayed release capsule    metoprolol tartrate (LOPRESSOR) 25 MG tablet   4. Anxiety   Continue on Celexa. Prescribed BuSpar 10 mg 3 times daily. Patient inquired about benzodiazepines and I recommended against it due to potential addiction. Patient expressed his understanding. He will try BuSpar and will follow-up with me in 2 weeks. busPIRone (BUSPAR) 10 MG tablet   5. Positive depression screening   Patient denies feeling depressed. He is rather anxious and will prescribe BuSpar 10 mg 3 times daily with plan to follow-up in 2 weeks Positive Screen for Clinical Depression with a Documented Follow-up Plan                    Completed Refills   Requested Prescriptions     Signed Prescriptions Disp Refills    omeprazole (PRILOSEC) 20 MG delayed release capsule 180 capsule 1     Sig: Take 1 capsule by mouth 2 times daily (before meals)    busPIRone (BUSPAR) 10 MG tablet 90 tablet 3     Sig: Take 1 tablet by mouth 3 times daily    metoprolol tartrate (LOPRESSOR) 25 MG tablet 180 tablet 1     Sig: Take 1 tablet by mouth 2 times daily     Return in about 2 weeks (around 5/10/2021) for HTN, anxiety.      Orders Placed This Encounter   Medications    omeprazole (PRILOSEC) 20 MG delayed release capsule     Sig: Take 1 capsule by mouth 2 times daily (before meals)     Dispense:  180 capsule     Refill:  1    busPIRone (BUSPAR) 10 MG tablet     Sig: Take 1 tablet by mouth 3 times daily     Dispense:  90 tablet Refill:  3    metoprolol tartrate (LOPRESSOR) 25 MG tablet     Sig: Take 1 tablet by mouth 2 times daily     Dispense:  180 tablet     Refill:  1     Orders Placed This Encounter   Procedures   Vineet Tripp MD, General Surgery, Natali Townsend     Referral Priority:   Routine     Referral Type:   Eval and Treat     Referral Reason:   Specialty Services Required     Referred to Provider:   Julien Carolina MD     Requested Specialty:   General Surgery     Number of Visits Requested:   1    Positive Screen for Clinical Depression with a Documented Follow-up Plan          Patient Instructions   SURVEY:    You may be receiving a survey from [employer] regarding your visit today. Please complete the survey to enable us to provide the highest quality of care to you and your family. If you cannot score us a very good on any question, please call the office to discuss how we could of made your experience a very good one. Thank you. You may be receiving a survey from [employer] regarding your visit today. You may get this in the mail, through your MyChart or in your email. Please complete the survey to enable us to provide the highest quality of care to you and your family. If you cannot score us as very good ( 5 Stars) on any question, please feel free to call the office to discuss how we could have made your experience exceptional.     Thank You!       MD Adela Bledsoe SHAHEEN Buck        Electronically signed by Sharita Morton MD on 4/26/2021 at 10:10 AM           Completed Refills      Requested Prescriptions     Signed Prescriptions Disp Refills    omeprazole (PRILOSEC) 20 MG delayed release capsule 180 capsule 1     Sig: Take 1 capsule by mouth 2 times daily (before meals)    busPIRone (BUSPAR) 10 MG tablet 90 tablet 3     Sig: Take 1 tablet by mouth 3 times daily    metoprolol tartrate (LOPRESSOR) 25 MG tablet 180 tablet 1     Sig: Take 1 tablet by mouth 2 times daily

## 2021-05-03 ENCOUNTER — INITIAL CONSULT (OUTPATIENT)
Dept: SURGERY | Age: 26
End: 2021-05-03
Payer: MEDICAID

## 2021-05-03 VITALS
DIASTOLIC BLOOD PRESSURE: 84 MMHG | SYSTOLIC BLOOD PRESSURE: 136 MMHG | BODY MASS INDEX: 32.02 KG/M2 | WEIGHT: 216.2 LBS | TEMPERATURE: 97.7 F | OXYGEN SATURATION: 98 % | HEIGHT: 69 IN | HEART RATE: 84 BPM

## 2021-05-03 DIAGNOSIS — R10.9 PAIN, ABDOMINAL, NONSPECIFIC: Primary | ICD-10-CM

## 2021-05-03 DIAGNOSIS — F12.90 MARIJUANA USE: ICD-10-CM

## 2021-05-03 DIAGNOSIS — Z87.442 HISTORY OF RENAL CALCULI: ICD-10-CM

## 2021-05-03 DIAGNOSIS — R11.14 BILIOUS VOMITING WITH NAUSEA: ICD-10-CM

## 2021-05-03 DIAGNOSIS — Z86.16 HISTORY OF 2019 NOVEL CORONAVIRUS DISEASE (COVID-19): ICD-10-CM

## 2021-05-03 DIAGNOSIS — Z72.0 TOBACCO ABUSE: ICD-10-CM

## 2021-05-03 DIAGNOSIS — R19.7 DIARRHEA, UNSPECIFIED TYPE: ICD-10-CM

## 2021-05-03 PROCEDURE — 4004F PT TOBACCO SCREEN RCVD TLK: CPT | Performed by: SURGERY

## 2021-05-03 PROCEDURE — G8417 CALC BMI ABV UP PARAM F/U: HCPCS | Performed by: SURGERY

## 2021-05-03 PROCEDURE — G8427 DOCREV CUR MEDS BY ELIG CLIN: HCPCS | Performed by: SURGERY

## 2021-05-03 PROCEDURE — 99214 OFFICE O/P EST MOD 30 MIN: CPT | Performed by: SURGERY

## 2021-05-03 RX ORDER — METRONIDAZOLE 500 MG/1
500 TABLET ORAL 3 TIMES DAILY
Qty: 30 TABLET | Refills: 0 | Status: SHIPPED | OUTPATIENT
Start: 2021-05-03 | End: 2021-05-13

## 2021-05-03 RX ORDER — SUCRALFATE ORAL 1 G/10ML
1 SUSPENSION ORAL 4 TIMES DAILY
Qty: 420 ML | Refills: 1 | Status: SHIPPED | OUTPATIENT
Start: 2021-05-03 | End: 2021-07-19

## 2021-05-03 RX ORDER — CIPROFLOXACIN 500 MG/1
500 TABLET, FILM COATED ORAL 2 TIMES DAILY
Qty: 20 TABLET | Refills: 0 | Status: SHIPPED | OUTPATIENT
Start: 2021-05-03 | End: 2021-05-13

## 2021-05-03 ASSESSMENT — ENCOUNTER SYMPTOMS
COUGH: 0
BLOOD IN STOOL: 0
ABDOMINAL PAIN: 1
SORE THROAT: 0
DIARRHEA: 1
VOMITING: 1
BACK PAIN: 0
SHORTNESS OF BREATH: 0
CHOKING: 0
ABDOMINAL DISTENTION: 1
NAUSEA: 1
TROUBLE SWALLOWING: 0

## 2021-05-03 NOTE — LETTER
[address] [place] [address]  Phone: [phone]  Fax: [phone]    Lucy Washington MD        May 3, 2021     Sara Mustafa, [address] [place] [id number]    Patient: Fatimah Clark  MR Number: [id number]  YOB: 1995  Date of Visit: 5/3/2021    Dear Dr. Sara Mustafa:    Thank you for the request for consultation for Gerardo Wilson to me for the evaluation of nausea, vomiting, diarrhea. Below are the relevant portions of my assessment and plan of care. ASSESSMENT     Diagnosis Orders   1. Pain, abdominal, nonspecific     2. Bilious vomiting with nausea  sucralfate (CARAFATE) 1 GM/10ML suspension   3. Diarrhea, unspecified type  Blood Occult Stool Diagnostic    Clostridium Difficile Toxin/Antigen    Culture, Stool    Giardia / Cryptosporidum antigens    Stool for WBC #1    ciprofloxacin (CIPRO) 500 MG tablet    metroNIDAZOLE (FLAGYL) 500 MG tablet   4. History of 2019 novel coronavirus disease (COVID-19)     5. BMI 31.0-31.9,adult     6. History of renal calculi     7. Tobacco abuse     8. Marijuana use       PLAN    Discussed at length with Mr. Elizabeth Zhong is 3-week history of nausea, vomiting, and diarrhea. Extensive work-up through [address] including multiple labs, CT scan abdomen pelvis, gallbladder ultrasound shows no definitive etiology. There are no immediate surgical nor endoscopic indications at this time. Recently diagnosed with COVID-19. Will request stool studies. Prescription for Carafate, Cipro and Flagyl provided. [place] diet for now with ample fluid intake. Encouraged ongoing tobacco cessation, last cigarette April 8, 2021. Also encouraged abstinence from marijuana use. He has requested short-term disability from work. I believe it is reasonable to continue further work-up and begin antibiotics at this time. Follow-up with me over the next few weeks for reevaluation if necessary. Return earlier if symptoms worsen or if new symptoms develop. If you have questions, please do not hesitate to call me. I look forward to following Jerome Yeung along with you.     Sincerely,        Saad Jose MD

## 2021-05-03 NOTE — PROGRESS NOTES
Ninfa Hager MD  General Surgery, Endoscopy  Chief Medical Officer    [facility]  [address] [facility] [address]  Dept: [phone]  Fax: [phone] [address] [facility]  Chief Complaint   Patient presents with   Delmi Tanja Surgical Consult     Patient c/o Abdominal pain epigastric region, nausea, vomitting, diarrhea,chills since 04/08, [facility], Patient states he had CT,US done. Patient has history of COVID-19 03/21 with mild sx, No vaccination        HPI    Mr Jannet Maldonado is a 79-year-old white male who began having mild respiratory symptoms early to mid March 2021 and was found to be COVID-19 positive March 21, 2021. Respiratory symptoms were self-limited. He states that on April 8 he developed nausea, vomiting, diarrhea, dysuria, and chills which have persisted without relief for more than 3 weeks. No rectal bleeding. He was evaluated early this morning at [facility] in [place] at which time WBC 7.6, H/H 14/42, normal LFTs, UA negative. CT scan abdomen pelvis showed no acute changes April 23, 2021. Gallbladder ultrasound at [facility] May 3, 2021 is normal.  Is not smoked cigarettes since April 8. Denies drug use, but was positive for urine cannabinoids April 18, 2021. Denies recent sick contacts nor travel. No recent weight changes, 216 pounds, BMI 32. No prior abdominal surgery nor endoscopy. No family history of GI malignancy nor other significant GI problems. Review of Systems   Constitutional: Negative for activity change, appetite change, chills, fever and unexpected weight change. HENT: Negative for nosebleeds, sneezing, sore throat and trouble swallowing. Eyes: Negative for visual disturbance. Respiratory: Negative for cough, choking and shortness of breath. Cardiovascular: Negative for chest pain, palpitations and leg swelling.    Gastrointestinal: Positive for abdominal distention, abdominal pain, diarrhea, nausea and vomiting. Negative for blood in stool. Genitourinary: Negative for dysuria, flank pain and hematuria. Musculoskeletal: Negative for back pain, gait problem and myalgias. Allergic/Immunologic: Negative for immunocompromised state. Neurological: Negative for dizziness, seizures, syncope, weakness and headaches. Hematological: Does not bruise/bleed easily. Psychiatric/Behavioral: Positive for dysphoric mood. Negative for confusion and sleep disturbance. Past Medical History:   Diagnosis Date    Anxiety     Depression     Kidney stone        Past Surgical History:   Procedure Laterality Date    PILONIDAL CYST EXCISION      8 mos ago 2015       No family history on file. Allergies:  See list    Current Outpatient Medications   Medication Sig Dispense Refill    sucralfate (CARAFATE) 1 GM/10ML suspension Take 1 g by mouth      PHOS--160-250 MG PACK       ondansetron (ZOFRAN-ODT) 4 MG disintegrating tablet       promethazine (PHENERGAN) 25 MG tablet       dicyclomine (BENTYL) 20 MG tablet       omeprazole (PRILOSEC) 20 MG delayed release capsule Take 1 capsule by mouth 2 times daily (before meals) 180 capsule 1    busPIRone (BUSPAR) 10 MG tablet Take 1 tablet by mouth 3 times daily 90 tablet 3    metoprolol tartrate (LOPRESSOR) 25 MG tablet Take 1 tablet by mouth 2 times daily 180 tablet 1    citalopram (CELEXA) 40 MG tablet Take 1 tablet by mouth daily 90 tablet 2     No current facility-administered medications for this visit.         Social History     Socioeconomic History    Marital status: Single     Spouse name: None    Number of children: None    Years of education: None    Highest education level: None   Occupational History    None   Social Needs    Financial resource strain: Not hard at all   [employer] insecurity     Worry: Never true     Inability: Never true   [employer] needs     Medical: No     Non-medical: No   Tobacco Use    Smoking status: Current Some Day Smoker     Packs/day: 0.50     Years: 5.00     Pack years: 2.50     Types: E-Cigarettes, Cigarettes    Smokeless tobacco: Never Used   Substance and Sexual Activity    Alcohol use: Yes     Alcohol/week: 5.0 standard drinks     Types: 5 Glasses of wine per week     Comment: occasional    Drug use: No    Sexual activity: None   Lifestyle    Physical activity     Days per week: None     Minutes per session: None    Stress: None   Relationships    Social connections     Talks on phone: None     Gets together: None     Attends Orthodox service: None     Active member of club or organization: None     Attends meetings of clubs or organizations: None     Relationship status: None    Intimate partner violence     Fear of current or ex partner: None     Emotionally abused: None     Physically abused: None     Forced sexual activity: None   Other Topics Concern    None   Social History Narrative    None       /84 (Site: Right Upper Arm, Position: Sitting, Cuff Size: Medium Adult)   Pulse 84   Temp 97.7 °F (36.5 °C) (Temporal)   Ht 5' 9\" (1.753 m)   Wt 216 lb 3.2 oz (98.1 kg)   SpO2 98%   BMI 31.93 kg/m²      Physical Exam  Vitals signs and nursing note reviewed. Constitutional:       Appearance: He is well-developed. HENT:      Head: Normocephalic and atraumatic. Eyes:      General: No scleral icterus. Conjunctiva/sclera: Conjunctivae normal.      Pupils: Pupils are equal, round, and reactive to light. Neck:      Musculoskeletal: Normal range of motion and neck supple. Vascular: No JVD. Trachea: No tracheal deviation. Cardiovascular:      Rate and Rhythm: Normal rate and regular rhythm. Pulmonary:      Effort: Pulmonary effort is normal. No respiratory distress. Chest:      Chest wall: No tenderness. Abdominal:      General: There is no distension. Palpations: Abdomen is soft. There is no mass. Tenderness: There is no abdominal tenderness. There is no guarding or rebound. Musculoskeletal: Normal range of motion. Lymphadenopathy:      Cervical: No cervical adenopathy. Skin:     General: Skin is warm and dry. Findings: No erythema or rash. Neurological:      Mental Status: He is alert and oriented to person, place, and time. Cranial Nerves: No cranial nerve deficit. Psychiatric:         Behavior: Behavior normal.         Thought Content: Thought content normal.         Judgment: Judgment normal.         IMAGING/LABS    Labs, CT scan abdomen pelvis, gallbladder ultrasound all performed recently at [facility]. ASSESSMENT     Diagnosis Orders   1. Pain, abdominal, nonspecific     2. Bilious vomiting with nausea  sucralfate (CARAFATE) 1 GM/10ML suspension   3. Diarrhea, unspecified type  Blood Occult Stool Diagnostic    Clostridium Difficile Toxin/Antigen    Culture, Stool    Giardia / Cryptosporidum antigens    Stool for WBC #1    ciprofloxacin (CIPRO) 500 MG tablet    metroNIDAZOLE (FLAGYL) 500 MG tablet   4. History of 2019 novel coronavirus disease (COVID-19)     5. BMI 31.0-31.9,adult     6. History of renal calculi     7. Tobacco abuse     8. Marijuana use       PLAN    Discussed at length with Mr. Katie Garcia is 3-week history of nausea, vomiting, and diarrhea. Extensive work-up through [facility] in [facility] including multiple labs, CT scan abdomen pelvis, gallbladder ultrasound shows no definitive etiology. There are no immediate surgical nor endoscopic indications at this time. Recently diagnosed with COVID-19. Will request stool studies. Prescription for Carafate, Cipro and Flagyl provided. Kidder diet for now with ample fluid intake. Encouraged ongoing tobacco cessation, last cigarette April 8, 2021. Also encouraged abstinence from marijuana use. He has requested short-term disability from work. I believe it is reasonable to continue further work-up and begin antibiotics at this time.   Follow-up with me over the next few weeks for reevaluation if necessary. Return earlier if symptoms worsen or if new symptoms develop.      Mendoza Payne MD

## 2021-05-03 NOTE — PATIENT INSTRUCTIONS
SURVEY:    You may be receiving a survey from [employer] regarding your visit today. Please complete the survey to enable us to provide the highest quality of care to you and your family. If you cannot score us a very good on any question, please call the office to discuss how we could of made your experience a very good one. Thank you.

## 2021-05-04 ENCOUNTER — TELEPHONE (OUTPATIENT)
Dept: FAMILY MEDICINE CLINIC | Age: 26
End: 2021-05-04

## 2021-05-04 RX ORDER — AMLODIPINE BESYLATE 5 MG/1
5 TABLET ORAL DAILY
Qty: 30 TABLET | Refills: 5 | Status: SHIPPED | OUTPATIENT
Start: 2021-05-04 | End: 2021-07-19

## 2021-05-04 NOTE — TELEPHONE ENCOUNTER
Okay to stop metoprolol. I sent the prescription for amlodipine 5 mg daily.   Please ask him to follow-up for high blood pressure in 2 -4 weeks  Thanks

## 2021-05-04 NOTE — TELEPHONE ENCOUNTER
Patient called stating the metoprolol is causing adverse side effects. He states he has itching and burning all over his body as if he has poison ivy. Patient states that when this is going on he has a pulse rate of 130-150 bpm. Patient is asking if he can go back on the lisinopril or something else. Please advise, thank you    Health Maintenance   Topic Date Due    Hepatitis C screen  Never done    Varicella vaccine (1 of 2 - 2-dose childhood series) Never done    COVID-19 Vaccine (1) Never done    DTaP/Tdap/Td vaccine (1 - Tdap) 06/25/2021 (Originally 1/13/2013)    Pneumococcal 0-64 years Vaccine (1 of 1 - PPSV23) 06/25/2021 (Originally 1/13/2000)    Flu vaccine (Season Ended) 09/01/2021    Potassium monitoring  12/10/2021    Creatinine monitoring  12/10/2021    HIV screen  Completed    Hepatitis A vaccine  Aged Out    Hepatitis B vaccine  Aged Out    Hib vaccine  Aged Out    Meningococcal (ACWY) vaccine  Aged Out             (applicable per patient's age: Cancer Screenings, Depression Screening, Fall Risk Screening, Immunizations)    Hemoglobin A1C (%)   Date Value   05/08/2016 5.1     AST (U/L)   Date Value   12/10/2020 38     ALT (U/L)   Date Value   12/10/2020 60     BUN (mg/dL)   Date Value   12/10/2020 18      (goal A1C is < 7)   (goal LDL is <100) need 30-50% reduction from baseline     BP Readings from Last 3 Encounters:   05/03/21 136/84   04/26/21 (!) 164/90   12/08/20 (!) 159/106    (goal /80)      All Future Testing planned in [facility]:  Lab Frequency Next Occurrence   Blood Occult Stool Diagnostic Once 05/04/2021   Clostridium Difficile Toxin/Antigen Once 05/04/2021   Culture, Stool Once 05/04/2021   Giardia / Cryptosporidum antigens Once 05/04/2021   Stool for WBC #1 Once 05/04/2021       Next Visit Date:  No future appointments.          Patient Active Problem List:     Essential hypertension     Gastroesophageal reflux disease without esophagitis     Anxiety     Pain of upper

## 2021-05-17 ENCOUNTER — OFFICE VISIT (OUTPATIENT)
Dept: FAMILY MEDICINE CLINIC | Age: 26
End: 2021-05-17
Payer: MEDICAID

## 2021-05-17 VITALS
BODY MASS INDEX: 31.31 KG/M2 | SYSTOLIC BLOOD PRESSURE: 142 MMHG | DIASTOLIC BLOOD PRESSURE: 88 MMHG | TEMPERATURE: 98 F | HEART RATE: 104 BPM | WEIGHT: 212 LBS | OXYGEN SATURATION: 98 %

## 2021-05-17 DIAGNOSIS — F41.9 ANXIETY: ICD-10-CM

## 2021-05-17 DIAGNOSIS — I10 ESSENTIAL HYPERTENSION: Primary | ICD-10-CM

## 2021-05-17 PROCEDURE — G8417 CALC BMI ABV UP PARAM F/U: HCPCS | Performed by: INTERNAL MEDICINE

## 2021-05-17 PROCEDURE — G8427 DOCREV CUR MEDS BY ELIG CLIN: HCPCS | Performed by: INTERNAL MEDICINE

## 2021-05-17 PROCEDURE — 4004F PT TOBACCO SCREEN RCVD TLK: CPT | Performed by: INTERNAL MEDICINE

## 2021-05-17 PROCEDURE — 99213 OFFICE O/P EST LOW 20 MIN: CPT | Performed by: INTERNAL MEDICINE

## 2021-05-17 RX ORDER — HYDROXYZINE HYDROCHLORIDE 25 MG/1
25 TABLET, FILM COATED ORAL EVERY 8 HOURS PRN
Qty: 30 TABLET | Refills: 1 | Status: SHIPPED | OUTPATIENT
Start: 2021-05-17 | End: 2021-05-27

## 2021-05-17 RX ORDER — BUSPIRONE HYDROCHLORIDE 15 MG/1
15 TABLET ORAL 3 TIMES DAILY
Qty: 90 TABLET | Refills: 2 | Status: SHIPPED | OUTPATIENT
Start: 2021-05-17 | End: 2021-09-27 | Stop reason: SDUPTHER

## 2021-05-17 ASSESSMENT — ENCOUNTER SYMPTOMS
WHEEZING: 0
BLOOD IN STOOL: 0
ALLERGIC/IMMUNOLOGIC NEGATIVE: 1
SORE THROAT: 0
NAUSEA: 0
SHORTNESS OF BREATH: 0
ABDOMINAL PAIN: 0
CONSTIPATION: 0
COUGH: 0

## 2021-05-17 NOTE — PATIENT INSTRUCTIONS
SURVEY:    You may be receiving a survey from [employer] regarding your visit today. Please complete the survey to enable us to provide the highest quality of care to you and your family. If you cannot score us a very good on any question, please call the office to discuss how we could of made your experience a very good one. Thank you. You may be receiving a survey from [employer] regarding your visit today. You may get this in the mail, through your MyChart or in your email. Please complete the survey to enable us to provide the highest quality of care to you and your family. If you cannot score us as very good ( 5 Stars) on any question, please feel free to call the office to discuss how we could have made your experience exceptional.     Thank You!       MD Rohith Kulkarni RMA

## 2021-05-17 NOTE — PROGRESS NOTES
HPI Notes    Name: Coral Curry  : 1995         Chief Complaint:     Chief Complaint   Patient presents with    Hypertension     Patient presents today for blood pressure check after med change. Patient believes that the HTN is caused by the anxiety. Patient has gone back to taking 25mg TID of metoprolol without any side effects. History of Present Illness:        Ginny Langston presents office to follow-up for uncontrolled hypertension and anxiety    During his last visit his blood pressure was elevated and he was mildly tachycardic. Symptoms were presumed to be secondary to anxiety. He was on Lopressor 25 twice daily, however developed itching and this was stopped on  and amlodipine 5 mg was prescribed with advised to follow-up in about 2 weeks. He ended up going back to Lopressor and not taking Amlodipine  He noticed that his BP goes up when he is having a panic attack . He has a history of anxiety. On citalopram we prescribed BuSpar for anxiety on . Symptoms are better, but continues to have episodes of anxiety attacks/panic attacks. Currently not in counseling. Denies feeling depressed    Hypertension  This is a chronic problem. The current episode started more than 1 year ago. The problem is controlled. Pertinent negatives include no chest pain, headaches, palpitations, peripheral edema or shortness of breath. There are no associated agents to hypertension. Risk factors for coronary artery disease include obesity and male gender. Past treatments include beta blockers. The current treatment provides moderate improvement. There are no compliance problems. There is no history of kidney disease, CAD/MI or CVA.            Past Medical History:     Past Medical History:   Diagnosis Date    Anxiety     Depression     Kidney stone       Reviewed all health maintenance requirements and orderedappropriate tests  Health Maintenance Due   Topic Date Due    Hepatitis C screen  Never history of drug use. Family History:     No family history on file. Review of Systems:         Review of Systems   Constitutional: Negative for activity change, appetite change and unexpected weight change. HENT: Negative for congestion, ear discharge, ear pain and sore throat. Eyes: Negative for visual disturbance. Respiratory: Negative for cough, shortness of breath and wheezing. Cardiovascular: Negative for chest pain, palpitations and leg swelling. Gastrointestinal: Negative for abdominal pain, blood in stool, constipation and nausea. Endocrine: Negative for cold intolerance, heat intolerance, polydipsia and polyuria. Genitourinary: Negative for difficulty urinating and dysuria. Musculoskeletal: Negative. Skin: Negative for rash. Allergic/Immunologic: Negative. Neurological: Negative for dizziness, weakness and headaches. Psychiatric/Behavioral: Positive for sleep disturbance. Negative for behavioral problems, dysphoric mood, self-injury and suicidal ideas. The patient is nervous/anxious. Physical Exam:     Vitals:  BP (!) 142/88 (Site: Right Upper Arm, Position: Sitting, Cuff Size: Medium Adult)   Pulse 104   Temp 98 °F (36.7 °C)   Wt 212 lb (96.2 kg)   SpO2 98%   BMI 31.31 kg/m²       Physical Exam  Vitals reviewed. Constitutional:       General: He is not in acute distress. Appearance: He is well-developed. HENT:      Head: Normocephalic and atraumatic. Neck:      Thyroid: No thyromegaly. Cardiovascular:      Rate and Rhythm: Normal rate and regular rhythm. Heart sounds: Normal heart sounds. No murmur heard. Pulmonary:      Effort: Pulmonary effort is normal.      Breath sounds: Normal breath sounds. No wheezing or rales. Abdominal:      General: Bowel sounds are normal. There is no distension. Palpations: Abdomen is soft. There is no mass. Tenderness: There is no abdominal tenderness.    Musculoskeletal:         General: Normal Take 1 tablet by mouth every 8 hours as needed for Anxiety    busPIRone (BUSPAR) 15 MG tablet 90 tablet 2     Sig: Take 15 mg by mouth 3 times daily    metoprolol tartrate (LOPRESSOR) 25 MG tablet 180 tablet 1     Sig: Take 1 tablet by mouth 2 times daily     Return in about 2 weeks (around 5/31/2021) for HTN, anxiety. Orders Placed This Encounter   Medications    hydrOXYzine (ATARAX) 25 MG tablet     Sig: Take 1 tablet by mouth every 8 hours as needed for Anxiety     Dispense:  30 tablet     Refill:  1    busPIRone (BUSPAR) 15 MG tablet     Sig: Take 15 mg by mouth 3 times daily     Dispense:  90 tablet     Refill:  2    metoprolol tartrate (LOPRESSOR) 25 MG tablet     Sig: Take 1 tablet by mouth 2 times daily     Dispense:  180 tablet     Refill:  1     No orders of the defined types were placed in this encounter. Patient Instructions   SURVEY:    You may be receiving a survey from [employer] regarding your visit today. Please complete the survey to enable us to provide the highest quality of care to you and your family. If you cannot score us a very good on any question, please call the office to discuss how we could of made your experience a very good one. Thank you. You may be receiving a survey from [employer] regarding your visit today. You may get this in the mail, through your MyChart or in your email. Please complete the survey to enable us to provide the highest quality of care to you and your family. If you cannot score us as very good ( 5 Stars) on any question, please feel free to call the office to discuss how we could have made your experience exceptional.     Thank You!       MD Jasmin Singh RMA          Electronically signed by Genevieve Morrison MD on 5/17/2021 at 12:03 PM           Completed Refills      Requested Prescriptions     Signed Prescriptions Disp Refills    hydrOXYzine (ATARAX) 25 MG tablet 30 tablet 1     Sig: Take 1 tablet by mouth every 8 hours as needed for Anxiety    busPIRone (BUSPAR) 15 MG tablet 90 tablet 2     Sig: Take 15 mg by mouth 3 times daily    metoprolol tartrate (LOPRESSOR) 25 MG tablet 180 tablet 1     Sig: Take 1 tablet by mouth 2 times daily

## 2021-07-04 DIAGNOSIS — I10 ESSENTIAL HYPERTENSION: ICD-10-CM

## 2021-07-06 NOTE — TELEPHONE ENCOUNTER
Refill request  Last OV 5/17/2021  Last date RX written 5/17/21  Next scheduled appt Visit date not found  Medication pended    Health Maintenance   Topic Date Due    Hepatitis C screen  Never done    Varicella vaccine (1 of 2 - 2-dose childhood series) Never done    Pneumococcal 0-64 years Vaccine (1 of 2 - PPSV23) Never done    COVID-19 Vaccine (1) Never done    DTaP/Tdap/Td vaccine (1 - Tdap) Never done    Flu vaccine (1) 09/01/2021    Potassium monitoring  12/10/2021    Creatinine monitoring  12/10/2021    HIV screen  Completed    Hepatitis A vaccine  Aged Out    Hepatitis B vaccine  Aged Out    Hib vaccine  Aged Out    Meningococcal (ACWY) vaccine  Aged Out             (applicable per patient's age: Cancer Screenings, Depression Screening, Fall Risk Screening, Immunizations)    Hemoglobin A1C (%)   Date Value   05/08/2016 5.1     AST (U/L)   Date Value   12/10/2020 38     ALT (U/L)   Date Value   12/10/2020 60     BUN (mg/dL)   Date Value   12/10/2020 18      (goal A1C is < 7)   (goal LDL is <100) need 30-50% reduction from baseline     BP Readings from Last 3 Encounters:   05/17/21 (!) 142/88   05/03/21 136/84   04/26/21 (!) 164/90    (goal /80)      All Future Testing planned in [facility]:  Lab Frequency Next Occurrence   Blood Occult Stool Diagnostic Once 05/04/2021   Clostridium Difficile Toxin/Antigen Once 05/04/2021   Culture, Stool Once 05/04/2021   Giardia / Cryptosporidum antigens Once 05/04/2021   Stool for WBC #1 Once 05/04/2021       Next Visit Date:  No future appointments.          Patient Active Problem List:     Essential hypertension     Gastroesophageal reflux disease without esophagitis     Anxiety     Pain of upper abdomen

## 2021-07-19 ENCOUNTER — OFFICE VISIT (OUTPATIENT)
Dept: FAMILY MEDICINE CLINIC | Age: 26
End: 2021-07-19
Payer: MEDICAID

## 2021-07-19 VITALS
WEIGHT: 220.4 LBS | SYSTOLIC BLOOD PRESSURE: 130 MMHG | BODY MASS INDEX: 32.55 KG/M2 | HEART RATE: 71 BPM | DIASTOLIC BLOOD PRESSURE: 80 MMHG | TEMPERATURE: 98.3 F | OXYGEN SATURATION: 98 %

## 2021-07-19 DIAGNOSIS — F41.9 ANXIETY: ICD-10-CM

## 2021-07-19 DIAGNOSIS — I10 ESSENTIAL HYPERTENSION: Primary | ICD-10-CM

## 2021-07-19 DIAGNOSIS — K21.9 GASTROESOPHAGEAL REFLUX DISEASE WITHOUT ESOPHAGITIS: ICD-10-CM

## 2021-07-19 PROBLEM — E83.39 HYPOPHOSPHATEMIA: Status: ACTIVE | Noted: 2021-07-19

## 2021-07-19 PROBLEM — K92.0 HEMATEMESIS: Status: ACTIVE | Noted: 2021-07-19

## 2021-07-19 PROBLEM — R07.9 CHEST PAIN: Status: ACTIVE | Noted: 2021-07-19

## 2021-07-19 PROBLEM — R11.2 NAUSEA, VOMITING, AND DIARRHEA: Status: ACTIVE | Noted: 2021-07-19

## 2021-07-19 PROBLEM — R05.9 COUGH: Status: ACTIVE | Noted: 2021-07-19

## 2021-07-19 PROBLEM — M25.579 ACUTE ANKLE PAIN: Status: ACTIVE | Noted: 2021-07-19

## 2021-07-19 PROBLEM — E87.6 HYPOKALEMIA: Status: ACTIVE | Noted: 2021-07-19

## 2021-07-19 PROBLEM — S93.409A SPRAIN OF ANKLE: Status: ACTIVE | Noted: 2021-07-19

## 2021-07-19 PROBLEM — R19.7 NAUSEA, VOMITING, AND DIARRHEA: Status: ACTIVE | Noted: 2021-07-19

## 2021-07-19 PROBLEM — K05.6 PERIODONTAL DISEASE: Status: ACTIVE | Noted: 2021-07-19

## 2021-07-19 PROBLEM — R06.02 SHORTNESS OF BREATH: Status: ACTIVE | Noted: 2021-07-19

## 2021-07-19 PROCEDURE — 99214 OFFICE O/P EST MOD 30 MIN: CPT | Performed by: INTERNAL MEDICINE

## 2021-07-19 PROCEDURE — 4004F PT TOBACCO SCREEN RCVD TLK: CPT | Performed by: INTERNAL MEDICINE

## 2021-07-19 PROCEDURE — G8427 DOCREV CUR MEDS BY ELIG CLIN: HCPCS | Performed by: INTERNAL MEDICINE

## 2021-07-19 PROCEDURE — G8417 CALC BMI ABV UP PARAM F/U: HCPCS | Performed by: INTERNAL MEDICINE

## 2021-07-19 RX ORDER — AMOXICILLIN 500 MG
CAPSULE ORAL DAILY
COMMUNITY

## 2021-07-19 RX ORDER — TRAMADOL HYDROCHLORIDE 50 MG/1
TABLET ORAL
COMMUNITY
Start: 2021-07-09 | End: 2021-07-19

## 2021-07-19 RX ORDER — LOSARTAN POTASSIUM 50 MG/1
50 TABLET ORAL DAILY
Qty: 30 TABLET | Refills: 5 | Status: SHIPPED | OUTPATIENT
Start: 2021-07-19 | End: 2022-01-31 | Stop reason: SDUPTHER

## 2021-07-19 RX ORDER — KETOROLAC TROMETHAMINE 10 MG/1
TABLET, FILM COATED ORAL
COMMUNITY
Start: 2021-06-19 | End: 2021-07-19

## 2021-07-19 RX ORDER — AMOXICILLIN 500 MG/1
CAPSULE ORAL
COMMUNITY
Start: 2021-07-09 | End: 2021-07-19

## 2021-07-19 SDOH — ECONOMIC STABILITY: FOOD INSECURITY: WITHIN THE PAST 12 MONTHS, THE FOOD YOU BOUGHT JUST DIDN'T LAST AND YOU DIDN'T HAVE MONEY TO GET MORE.: NEVER TRUE

## 2021-07-19 SDOH — ECONOMIC STABILITY: FOOD INSECURITY: WITHIN THE PAST 12 MONTHS, YOU WORRIED THAT YOUR FOOD WOULD RUN OUT BEFORE YOU GOT MONEY TO BUY MORE.: NEVER TRUE

## 2021-07-19 ASSESSMENT — SOCIAL DETERMINANTS OF HEALTH (SDOH): HOW HARD IS IT FOR YOU TO PAY FOR THE VERY BASICS LIKE FOOD, HOUSING, MEDICAL CARE, AND HEATING?: SOMEWHAT HARD

## 2021-07-19 ASSESSMENT — ENCOUNTER SYMPTOMS
ALLERGIC/IMMUNOLOGIC NEGATIVE: 1
CONSTIPATION: 0
BLOOD IN STOOL: 0
WHEEZING: 0
COUGH: 0
NAUSEA: 0
SORE THROAT: 0
SHORTNESS OF BREATH: 0
BLURRED VISION: 0
ABDOMINAL PAIN: 0

## 2021-07-19 NOTE — PROGRESS NOTES
HPI Notes    Name: Tomasz [facility]  : 1995         Chief Complaint:     Chief Complaint   Patient presents with    Hypertension     Patient presents today for a check up. Patient states that he still has elevated bp and he needs to have it lowered so that he can have a dental procedure. 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 Patient c/o having a headache from elevated bp    Other     Patient c/o really sharp pain on the either sides of his neck that has woken him out of his sleep. History of Present Illness:        Mike Sewell presents to office to follow up for HTN,GERD  and anxiety. States blood pressure has been staying high. Has poor dentition and expecting to have a dental procedures and  needs better blood pressure    Mike Sewell presents as a follow up on anxiety. Current medication for anxiety includes Celexa, Buspar. Mike Sewell has been on this medication for more than a year . The medication is  being tolerated well. Since starting the medication the symptoms of anxiety have significantly improved. Mike Sewell  is not in counciling. Hypertension  This is a chronic problem. The current episode started more than 1 year ago. The problem has been gradually worsening since onset. The problem is uncontrolled. Associated symptoms include headaches. Pertinent negatives include no blurred vision, chest pain, malaise/fatigue, palpitations, peripheral edema or shortness of breath. There are no associated agents to hypertension. Risk factors for coronary artery disease include obesity and male gender. Past treatments include beta blockers. The current treatment provides mild improvement. There are no compliance problems. There is no history of kidney disease, CAD/MI or CVA. Gastroesophageal Reflux  He reports no abdominal pain, no chest pain, no coughing, no nausea, no sore throat or no wheezing. This is a chronic problem.  The problem occurs rarely. The problem has been gradually improving. The symptoms are aggravated by certain foods and lying down. Risk factors include obesity. He has tried a PPI for the symptoms. The treatment provided significant relief. Past Medical History:     Past Medical History:   Diagnosis Date    Anxiety     Depression     Kidney stone       Reviewed all health maintenance requirements and orderedappropriate tests  Health Maintenance Due   Topic Date Due    Hepatitis C screen  Never done    Varicella vaccine (1 of 2 - 2-dose childhood series) Never done    Pneumococcal 0-64 years Vaccine (1 of 2 - PPSV23) Never done    COVID-19 Vaccine (1) Never done    DTaP/Tdap/Td vaccine (1 - Tdap) Never done       Past Surgical History:     Past Surgical History:   Procedure Laterality Date    PILONIDAL CYST EXCISION      8 mos ago 2015        Medications:       Prior to Admission medications    Medication Sig Start Date End Date Taking? Authorizing Provider   Omega-3 Fatty Acids (FISH OIL) 1200 MG CAPS Take by mouth daily   Yes Historical Provider, MD   metoprolol tartrate (LOPRESSOR) 25 MG tablet take 1 tablet by mouth twice a day 7/6/21  Yes Farideh Feng MD   busPIRone (BUSPAR) 15 MG tablet Take 15 mg by mouth 3 times daily 5/17/21  Yes Farideh Feng MD   omeprazole (PRILOSEC) 20 MG delayed release capsule Take 1 capsule by mouth 2 times daily (before meals) 4/26/21  Yes Farideh Feng MD   citalopram (CELEXA) 40 MG tablet Take 1 tablet by mouth daily 12/8/20  Yes Farideh Feng MD        Allergies:       Patient has no known allergies. Social History:     Tobacco: reports that he has been smoking e-cigarettes and cigarettes. He has a 2.50 pack-year smoking history. He has never used smokeless tobacco.  Alcohol:      reports current alcohol use of about 5.0 standard drinks of alcohol per week. Drug Use:  reports no history of drug use. Family History:     History reviewed.  No pertinent family history. Review of Systems:         Review of Systems   Constitutional: Negative for activity change, appetite change, malaise/fatigue and unexpected weight change. HENT: Negative for congestion, ear discharge, ear pain and sore throat. Eyes: Negative for blurred vision and visual disturbance. Respiratory: Negative for cough, shortness of breath and wheezing. Cardiovascular: Negative for chest pain, palpitations and leg swelling. Gastrointestinal: Negative for abdominal pain, blood in stool, constipation and nausea. Endocrine: Negative for cold intolerance, heat intolerance, polydipsia and polyuria. Genitourinary: Negative for difficulty urinating and dysuria. Musculoskeletal: Negative. Skin: Negative for rash. Allergic/Immunologic: Negative. Neurological: Positive for headaches. Negative for dizziness and weakness. Hematological: Negative for adenopathy. Psychiatric/Behavioral: Negative for behavioral problems and dysphoric mood. The patient is not nervous/anxious. Physical Exam:     Vitals:  /80   Pulse 71   Temp 98.3 °F (36.8 °C)   Wt 220 lb 6.4 oz (100 kg)   SpO2 98%   BMI 32.55 kg/m²       Physical Exam  Vitals reviewed. Constitutional:       General: He is not in acute distress. Appearance: He is well-developed. He is obese. HENT:      Head: Normocephalic and atraumatic. Right Ear: External ear normal.      Left Ear: External ear normal.      Mouth/Throat:      Pharynx: Oropharynx is clear. No oropharyngeal exudate. Eyes:      General: No scleral icterus. Right eye: No discharge. Left eye: No discharge. Conjunctiva/sclera: Conjunctivae normal.   Neck:      Thyroid: No thyromegaly. Cardiovascular:      Rate and Rhythm: Normal rate and regular rhythm. Heart sounds: Normal heart sounds. No murmur heard. Pulmonary:      Effort: Pulmonary effort is normal.      Breath sounds: Normal breath sounds.  No wheezing or rales.   Abdominal:      General: Bowel sounds are normal. There is no distension. Palpations: Abdomen is soft. There is no mass. Tenderness: There is no abdominal tenderness. Musculoskeletal:         General: Normal range of motion. Right lower leg: No edema. Left lower leg: No edema. Lymphadenopathy:      Cervical: No cervical adenopathy. Skin:     General: Skin is warm and dry. Coloration: Skin is not pale. Findings: No rash. Neurological:      General: No focal deficit present. Mental Status: He is alert and oriented to person, place, and time. Psychiatric:         Mood and Affect: Mood normal.         Behavior: Behavior normal.         Thought Content: Thought content normal.         Judgment: Judgment normal.               Data:     Lab Results   Component Value Date     12/10/2020    K 3.9 12/10/2020     12/10/2020    CO2 27 12/10/2020    BUN 18 12/10/2020    CREATININE 1.30 12/10/2020    GLUCOSE 94 12/10/2020    PROT 7.9 05/10/2015    LABALBU 4.2 12/10/2020    BILITOT 0.4 12/10/2020    ALKPHOS 117 12/10/2020    AST 38 12/10/2020    ALT 60 12/10/2020     Lab Results   Component Value Date    WBC 10.08 12/10/2020    RBC 4.67 12/10/2020    HGB 14.0 12/10/2020    HCT 39.9 12/10/2020    MCV 85.4 12/10/2020    MCH 30.0 12/10/2020    MCHC 35.1 12/10/2020    RDW 11.9 12/10/2020     12/10/2020    MPV 11.6 12/10/2020     Lab Results   Component Value Date    TSH 1.58 12/10/2020     Lab Results   Component Value Date    LABA1C 5.1 05/08/2016          Assessment & Plan        Diagnosis Orders   1. Essential hypertension   Blood pressure fluctuating, per patient uncontrolled most of the time at home. Decided to continue on Lopressor 25 mg twice daily and add losartan 50 mg daily. Continue monitoring blood pressure and keep a logbook, call if it still elevated. Follow-up in 4 weeks    2.  Gastroesophageal reflux disease without esophagitis   Symptom yennyed, continue on omeprazole    3. Anxiety   Patient reports his symptoms are very well controlled, continue on Celexa and BuSpar                    Completed Refills   Requested Prescriptions     Pending Prescriptions Disp Refills    losartan (COZAAR) 50 MG tablet 90 tablet 1     Sig: Take 1 tablet by mouth daily     Return in about 4 weeks (around 8/16/2021) for HTN. No orders of the defined types were placed in this encounter. No orders of the defined types were placed in this encounter. Patient Instructions   SURVEY:    You may be receiving a survey from [employer] regarding your visit today. Please complete the survey to enable us to provide the highest quality of care to you and your family. If you cannot score us a very good on any question, please call the office to discuss how we could of made your experience a very good one. Thank you. You may be receiving a survey from [employer] regarding your visit today. You may get this in the mail, through your MyChart or in your email. Please complete the survey to enable us to provide the highest quality of care to you and your family. If you cannot score us as very good ( 5 Stars) on any question, please feel free to call the office to discuss how we could have made your experience exceptional.     Thank You!       MD Shelia ArmstrongA        Electronically signed by Champ Baldwin MD on 7/19/2021 at 2:06 PM           Completed Refills      Requested Prescriptions     Pending Prescriptions Disp Refills    losartan (COZAAR) 50 MG tablet 90 tablet 1     Sig: Take 1 tablet by mouth daily

## 2021-07-19 NOTE — PATIENT INSTRUCTIONS
SURVEY:    You may be receiving a survey from [employer] regarding your visit today. Please complete the survey to enable us to provide the highest quality of care to you and your family. If you cannot score us a very good on any question, please call the office to discuss how we could of made your experience a very good one. Thank you. You may be receiving a survey from [employer] regarding your visit today. You may get this in the mail, through your MyChart or in your email. Please complete the survey to enable us to provide the highest quality of care to you and your family. If you cannot score us as very good ( 5 Stars) on any question, please feel free to call the office to discuss how we could have made your experience exceptional.     Thank You!       MD Tara Gonzalez

## 2021-07-31 DIAGNOSIS — F41.9 ANXIETY: ICD-10-CM

## 2021-08-02 RX ORDER — CITALOPRAM 40 MG/1
TABLET ORAL
Qty: 90 TABLET | Refills: 2 | Status: SHIPPED | OUTPATIENT
Start: 2021-08-02 | End: 2021-08-03 | Stop reason: SDUPTHER

## 2021-08-03 DIAGNOSIS — F41.9 ANXIETY: ICD-10-CM

## 2021-08-03 NOTE — TELEPHONE ENCOUNTER
----- Message from Jono Mijares sent at 8/3/2021  4:04 PM EDT -----  Subject: Message to Provider    QUESTIONS  Information for Provider? Pts new called in and stated the the pt can   not find his medication, and was told to call the office and the Dr. Tanja Correa would send in a refill for the medication Citalopram.   ---------------------------------------------------------------------------  --------------  CALL BACK INFO  What is the best way for the office to contact you? OK to leave message on   voicemail  Preferred Call Back Phone Number? [phone]  ---------------------------------------------------------------------------  --------------  SCRIPT ANSWERS  Relationship to Patient? Other  Representative Name? Stefan Jackson  Is the Representative on the appropriate HIPAA document in Epic?  Yes

## 2021-08-04 RX ORDER — CITALOPRAM 40 MG/1
TABLET ORAL
Qty: 90 TABLET | Refills: 2 | Status: SHIPPED | OUTPATIENT
Start: 2021-08-04 | End: 2022-06-23

## 2021-08-18 PROBLEM — R05.9 COUGH: Status: RESOLVED | Noted: 2021-07-19 | Resolved: 2021-08-18

## 2021-09-27 DIAGNOSIS — F41.9 ANXIETY: ICD-10-CM

## 2021-09-27 RX ORDER — BUSPIRONE HYDROCHLORIDE 15 MG/1
15 TABLET ORAL 3 TIMES DAILY
Qty: 90 TABLET | Refills: 2 | Status: SHIPPED | OUTPATIENT
Start: 2021-09-27 | End: 2022-01-07

## 2021-09-27 NOTE — TELEPHONE ENCOUNTER
Patient is requesting refill on Buspar to be sent to [facility] in [place]. Note on script states it failed to send to pharmacy. Please advise.

## 2021-09-27 NOTE — TELEPHONE ENCOUNTER
Refill request  Last OV 7/19/2021  Last date RX written 5/17/21  Next scheduled appt Visit date not found  Medication pended    Health Maintenance   Topic Date Due    Hepatitis C screen  Never done    Varicella vaccine (1 of 2 - 2-dose childhood series) Never done    Pneumococcal 0-64 years Vaccine (1 of 2 - PPSV23) Never done    COVID-19 Vaccine (1) Never done    DTaP/Tdap/Td vaccine (1 - Tdap) Never done    Flu vaccine (1) Never done    Potassium monitoring  12/10/2021    Creatinine monitoring  12/10/2021    HIV screen  Completed    Hepatitis A vaccine  Aged Out    Hepatitis B vaccine  Aged Out    Hib vaccine  Aged Out    Meningococcal (ACWY) vaccine  Aged Out             (applicable per patient's age: Cancer Screenings, Depression Screening, Fall Risk Screening, Immunizations)    Hemoglobin A1C (%)   Date Value   05/08/2016 5.1     AST (U/L)   Date Value   12/10/2020 38     ALT (U/L)   Date Value   12/10/2020 60     BUN (mg/dL)   Date Value   12/10/2020 18      (goal A1C is < 7)   (goal LDL is <100) need 30-50% reduction from baseline     BP Readings from Last 3 Encounters:   07/19/21 130/80   05/17/21 (!) 142/88   05/03/21 136/84    (goal /80)      All Future Testing planned in [facility]:      Next Visit Date:  No future appointments.          Patient Active Problem List:     Essential hypertension     Gastroesophageal reflux disease without esophagitis     Anxiety     Pain of upper abdomen     Acute ankle pain     Chest pain     Hematemesis     Hypokalemia     Hypophosphatemia     Nausea, vomiting, and diarrhea     Periodontal disease     Shortness of breath     Sprain of ankle

## 2021-10-22 DIAGNOSIS — K21.9 GASTROESOPHAGEAL REFLUX DISEASE WITHOUT ESOPHAGITIS: ICD-10-CM

## 2021-10-22 DIAGNOSIS — I10 ESSENTIAL HYPERTENSION: ICD-10-CM

## 2021-10-22 RX ORDER — OMEPRAZOLE 20 MG/1
CAPSULE, DELAYED RELEASE ORAL
Qty: 180 CAPSULE | Refills: 1 | Status: SHIPPED | OUTPATIENT
Start: 2021-10-22 | End: 2022-10-24 | Stop reason: SDUPTHER

## 2022-01-07 DIAGNOSIS — F41.9 ANXIETY: ICD-10-CM

## 2022-01-07 RX ORDER — BUSPIRONE HYDROCHLORIDE 15 MG/1
TABLET ORAL
Qty: 90 TABLET | Refills: 2 | Status: SHIPPED | OUTPATIENT
Start: 2022-01-07 | End: 2022-05-31

## 2022-01-11 DIAGNOSIS — I10 ESSENTIAL HYPERTENSION: ICD-10-CM

## 2022-01-31 ENCOUNTER — OFFICE VISIT (OUTPATIENT)
Dept: FAMILY MEDICINE CLINIC | Age: 27
End: 2022-01-31
Payer: MEDICAID

## 2022-01-31 VITALS
BODY MASS INDEX: 35 KG/M2 | TEMPERATURE: 99.7 F | HEART RATE: 77 BPM | SYSTOLIC BLOOD PRESSURE: 124 MMHG | RESPIRATION RATE: 18 BRPM | DIASTOLIC BLOOD PRESSURE: 80 MMHG | OXYGEN SATURATION: 97 % | WEIGHT: 237 LBS

## 2022-01-31 DIAGNOSIS — F41.9 ANXIETY: ICD-10-CM

## 2022-01-31 DIAGNOSIS — I10 ESSENTIAL HYPERTENSION: Primary | ICD-10-CM

## 2022-01-31 DIAGNOSIS — K21.9 GASTROESOPHAGEAL REFLUX DISEASE WITHOUT ESOPHAGITIS: ICD-10-CM

## 2022-01-31 PROBLEM — K05.6 PERIODONTAL DISEASE: Status: RESOLVED | Noted: 2021-07-19 | Resolved: 2022-01-31

## 2022-01-31 PROBLEM — K92.0 HEMATEMESIS: Status: RESOLVED | Noted: 2021-07-19 | Resolved: 2022-01-31

## 2022-01-31 PROBLEM — R11.2 NAUSEA, VOMITING, AND DIARRHEA: Status: RESOLVED | Noted: 2021-07-19 | Resolved: 2022-01-31

## 2022-01-31 PROBLEM — R07.9 CHEST PAIN: Status: RESOLVED | Noted: 2021-07-19 | Resolved: 2022-01-31

## 2022-01-31 PROBLEM — R19.7 NAUSEA, VOMITING, AND DIARRHEA: Status: RESOLVED | Noted: 2021-07-19 | Resolved: 2022-01-31

## 2022-01-31 PROBLEM — S93.409A SPRAIN OF ANKLE: Status: RESOLVED | Noted: 2021-07-19 | Resolved: 2022-01-31

## 2022-01-31 PROBLEM — R06.02 SHORTNESS OF BREATH: Status: RESOLVED | Noted: 2021-07-19 | Resolved: 2022-01-31

## 2022-01-31 PROBLEM — R10.10 PAIN OF UPPER ABDOMEN: Status: RESOLVED | Noted: 2021-04-26 | Resolved: 2022-01-31

## 2022-01-31 PROCEDURE — G8484 FLU IMMUNIZE NO ADMIN: HCPCS | Performed by: INTERNAL MEDICINE

## 2022-01-31 PROCEDURE — 99214 OFFICE O/P EST MOD 30 MIN: CPT | Performed by: INTERNAL MEDICINE

## 2022-01-31 PROCEDURE — 4004F PT TOBACCO SCREEN RCVD TLK: CPT | Performed by: INTERNAL MEDICINE

## 2022-01-31 PROCEDURE — G8417 CALC BMI ABV UP PARAM F/U: HCPCS | Performed by: INTERNAL MEDICINE

## 2022-01-31 PROCEDURE — G8427 DOCREV CUR MEDS BY ELIG CLIN: HCPCS | Performed by: INTERNAL MEDICINE

## 2022-01-31 RX ORDER — LOSARTAN POTASSIUM 50 MG/1
50 TABLET ORAL 2 TIMES DAILY
Qty: 60 TABLET | Refills: 5 | Status: SHIPPED | OUTPATIENT
Start: 2022-01-31

## 2022-01-31 ASSESSMENT — ENCOUNTER SYMPTOMS
NAUSEA: 0
CONSTIPATION: 0
SORE THROAT: 0
SHORTNESS OF BREATH: 0
COUGH: 0
ABDOMINAL PAIN: 0
WHEEZING: 0
ALLERGIC/IMMUNOLOGIC NEGATIVE: 1
BLOOD IN STOOL: 0
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
## 2022-02-28 ENCOUNTER — TELEPHONE (OUTPATIENT)
Dept: FAMILY MEDICINE CLINIC | Age: 27
End: 2022-02-28

## 2022-02-28 NOTE — TELEPHONE ENCOUNTER
Patient returned call, I advised him that we cannot provide a note without seeing him for the issue.

## 2022-02-28 NOTE — TELEPHONE ENCOUNTER
Unfortunately we cannot provide him with a letter since we did not see him for this problem  Maybe he can ask his specialist on Wednesday?    Thank you

## 2022-02-28 NOTE — TELEPHONE ENCOUNTER
----- Message from [place] sent at 2/28/2022 11:43 AM EST -----  Subject: Message to Provider    QUESTIONS  Information for Provider? Pt is calling to see if his provider can write   him out a note saying that he can't go to work until Wednesday. Pt left   shoulder has gotten worse. Pt is seeing a specialist on Wednesday about   it. Please call pt back. Pt will be picking this up.   ---------------------------------------------------------------------------  --------------  CALL BACK INFO  What is the best way for the office to contact you? OK to leave message on   voicemail  Preferred Call Back Phone Number? [phone]  ---------------------------------------------------------------------------  --------------  SCRIPT ANSWERS  Relationship to Patient?  Self

## 2022-03-07 ENCOUNTER — TELEPHONE (OUTPATIENT)
Dept: FAMILY MEDICINE CLINIC | Age: 27
End: 2022-03-07

## 2022-03-07 DIAGNOSIS — F41.9 ANXIETY: Primary | ICD-10-CM
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
## 2022-05-28 DIAGNOSIS — F41.9 ANXIETY: ICD-10-CM

## 2022-05-31 RX ORDER — BUSPIRONE HYDROCHLORIDE 15 MG/1
TABLET ORAL
Qty: 90 TABLET | Refills: 2 | Status: SHIPPED | OUTPATIENT
Start: 2022-05-31 | End: 2022-08-29

## 2022-06-23 DIAGNOSIS — F41.9 ANXIETY: ICD-10-CM

## 2022-06-23 RX ORDER — CITALOPRAM 40 MG/1
TABLET ORAL
Qty: 90 TABLET | Refills: 2 | Status: SHIPPED | OUTPATIENT
Start: 2022-06-23

## 2022-07-24 DIAGNOSIS — I10 ESSENTIAL HYPERTENSION: ICD-10-CM

## 2022-08-27 DIAGNOSIS — F41.9 ANXIETY: ICD-10-CM

## 2022-08-29 RX ORDER — BUSPIRONE HYDROCHLORIDE 15 MG/1
TABLET ORAL
Qty: 90 TABLET | Refills: 2 | Status: SHIPPED | OUTPATIENT
Start: 2022-08-29

## 2022-10-24 DIAGNOSIS — K21.9 GASTROESOPHAGEAL REFLUX DISEASE WITHOUT ESOPHAGITIS: ICD-10-CM

## 2022-10-24 DIAGNOSIS — I10 ESSENTIAL HYPERTENSION: ICD-10-CM

## 2022-10-24 RX ORDER — OMEPRAZOLE 20 MG/1
CAPSULE, DELAYED RELEASE ORAL
Qty: 180 CAPSULE | Refills: 1 | Status: SHIPPED | OUTPATIENT
Start: 2022-10-24

## 2022-10-31 ENCOUNTER — OFFICE VISIT (OUTPATIENT)
Dept: FAMILY MEDICINE CLINIC | Age: 27
End: 2022-10-31
Payer: MEDICAID

## 2022-10-31 VITALS
OXYGEN SATURATION: 96 % | BODY MASS INDEX: 33.86 KG/M2 | WEIGHT: 228.6 LBS | RESPIRATION RATE: 18 BRPM | HEART RATE: 80 BPM | HEIGHT: 69 IN | SYSTOLIC BLOOD PRESSURE: 118 MMHG | DIASTOLIC BLOOD PRESSURE: 78 MMHG

## 2022-10-31 DIAGNOSIS — I10 ESSENTIAL HYPERTENSION: Primary | ICD-10-CM

## 2022-10-31 DIAGNOSIS — F32.A DEPRESSION, UNSPECIFIED DEPRESSION TYPE: ICD-10-CM

## 2022-10-31 DIAGNOSIS — G89.29 CHRONIC MIDLINE LOW BACK PAIN WITHOUT SCIATICA: ICD-10-CM

## 2022-10-31 DIAGNOSIS — M54.50 CHRONIC MIDLINE LOW BACK PAIN WITHOUT SCIATICA: ICD-10-CM

## 2022-10-31 DIAGNOSIS — K21.9 GASTROESOPHAGEAL REFLUX DISEASE WITHOUT ESOPHAGITIS: ICD-10-CM

## 2022-10-31 PROCEDURE — 3078F DIAST BP <80 MM HG: CPT | Performed by: INTERNAL MEDICINE

## 2022-10-31 PROCEDURE — G8427 DOCREV CUR MEDS BY ELIG CLIN: HCPCS | Performed by: INTERNAL MEDICINE

## 2022-10-31 PROCEDURE — G8417 CALC BMI ABV UP PARAM F/U: HCPCS | Performed by: INTERNAL MEDICINE

## 2022-10-31 PROCEDURE — G8484 FLU IMMUNIZE NO ADMIN: HCPCS | Performed by: INTERNAL MEDICINE

## 2022-10-31 PROCEDURE — 99214 OFFICE O/P EST MOD 30 MIN: CPT | Performed by: INTERNAL MEDICINE

## 2022-10-31 PROCEDURE — 4004F PT TOBACCO SCREEN RCVD TLK: CPT | Performed by: INTERNAL MEDICINE

## 2022-10-31 PROCEDURE — 3074F SYST BP LT 130 MM HG: CPT | Performed by: INTERNAL MEDICINE

## 2022-10-31 SDOH — ECONOMIC STABILITY: FOOD INSECURITY: WITHIN THE PAST 12 MONTHS, THE FOOD YOU BOUGHT JUST DIDN'T LAST AND YOU DIDN'T HAVE MONEY TO GET MORE.: NEVER TRUE

## 2022-10-31 SDOH — ECONOMIC STABILITY: FOOD INSECURITY: WITHIN THE PAST 12 MONTHS, YOU WORRIED THAT YOUR FOOD WOULD RUN OUT BEFORE YOU GOT MONEY TO BUY MORE.: NEVER TRUE

## 2022-10-31 ASSESSMENT — ENCOUNTER SYMPTOMS
WHEEZING: 0
SORE THROAT: 0
COUGH: 0

## 2022-10-31 ASSESSMENT — PATIENT HEALTH QUESTIONNAIRE - PHQ9
2. FEELING DOWN, DEPRESSED OR HOPELESS: 0
1. LITTLE INTEREST OR PLEASURE IN DOING THINGS: 0
SUM OF ALL RESPONSES TO PHQ QUESTIONS 1-9: 0
SUM OF ALL RESPONSES TO PHQ9 QUESTIONS 1 & 2: 0

## 2022-10-31 ASSESSMENT — SOCIAL DETERMINANTS OF HEALTH (SDOH): HOW HARD IS IT FOR YOU TO PAY FOR THE VERY BASICS LIKE FOOD, HOUSING, MEDICAL CARE, AND HEATING?: NOT HARD AT ALL
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
## 2022-11-01 ASSESSMENT — ENCOUNTER SYMPTOMS
BACK PAIN: 1
SHORTNESS OF BREATH: 0
NAUSEA: 0
ABDOMINAL PAIN: 0
BOWEL INCONTINENCE: 0

## 2022-12-01 DIAGNOSIS — F41.9 ANXIETY: ICD-10-CM

## 2022-12-01 RX ORDER — BUSPIRONE HYDROCHLORIDE 15 MG/1
TABLET ORAL
Qty: 90 TABLET | Refills: 2 | Status: SHIPPED | OUTPATIENT
Start: 2022-12-01

## 2022-12-13 ENCOUNTER — OFFICE VISIT (OUTPATIENT)
Dept: FAMILY MEDICINE CLINIC | Age: 27
End: 2022-12-13
Payer: MEDICAID

## 2022-12-13 VITALS
RESPIRATION RATE: 18 BRPM | HEART RATE: 93 BPM | SYSTOLIC BLOOD PRESSURE: 133 MMHG | OXYGEN SATURATION: 98 % | DIASTOLIC BLOOD PRESSURE: 93 MMHG | WEIGHT: 223 LBS | HEIGHT: 69 IN | BODY MASS INDEX: 33.03 KG/M2

## 2022-12-13 DIAGNOSIS — F41.9 ANXIETY: Primary | ICD-10-CM

## 2022-12-13 DIAGNOSIS — I10 ESSENTIAL HYPERTENSION: ICD-10-CM

## 2022-12-13 DIAGNOSIS — F32.A DEPRESSION, UNSPECIFIED DEPRESSION TYPE: ICD-10-CM

## 2022-12-13 DIAGNOSIS — R05.2 SUBACUTE COUGH: ICD-10-CM

## 2022-12-13 PROCEDURE — 4004F PT TOBACCO SCREEN RCVD TLK: CPT | Performed by: INTERNAL MEDICINE

## 2022-12-13 PROCEDURE — G8417 CALC BMI ABV UP PARAM F/U: HCPCS | Performed by: INTERNAL MEDICINE

## 2022-12-13 PROCEDURE — 3074F SYST BP LT 130 MM HG: CPT | Performed by: INTERNAL MEDICINE

## 2022-12-13 PROCEDURE — G8484 FLU IMMUNIZE NO ADMIN: HCPCS | Performed by: INTERNAL MEDICINE

## 2022-12-13 PROCEDURE — G8427 DOCREV CUR MEDS BY ELIG CLIN: HCPCS | Performed by: INTERNAL MEDICINE

## 2022-12-13 PROCEDURE — 3078F DIAST BP <80 MM HG: CPT | Performed by: INTERNAL MEDICINE

## 2022-12-13 PROCEDURE — 99214 OFFICE O/P EST MOD 30 MIN: CPT | Performed by: INTERNAL MEDICINE

## 2022-12-13 RX ORDER — VENLAFAXINE HYDROCHLORIDE 37.5 MG/1
37.5 CAPSULE, EXTENDED RELEASE ORAL DAILY
Qty: 30 CAPSULE | Refills: 3 | Status: SHIPPED | OUTPATIENT
Start: 2022-12-13

## 2022-12-13 RX ORDER — KETOROLAC TROMETHAMINE 10 MG/1
10 TABLET, FILM COATED ORAL EVERY 6 HOURS PRN
COMMUNITY
Start: 2022-02-26

## 2022-12-13 RX ORDER — ONDANSETRON 4 MG/1
TABLET, ORALLY DISINTEGRATING ORAL
COMMUNITY
Start: 2022-11-15

## 2022-12-13 RX ORDER — HYDROCHLOROTHIAZIDE 25 MG/1
25 TABLET ORAL EVERY MORNING
Qty: 30 TABLET | Refills: 5 | Status: SHIPPED | OUTPATIENT
Start: 2022-12-13

## 2022-12-13 RX ORDER — IBUPROFEN 600 MG/1
TABLET ORAL
COMMUNITY
Start: 2022-11-15

## 2022-12-13 ASSESSMENT — ENCOUNTER SYMPTOMS
SHORTNESS OF BREATH: 0
SORE THROAT: 0
CONSTIPATION: 0
COUGH: 1
WHEEZING: 0
ALLERGIC/IMMUNOLOGIC NEGATIVE: 1
ABDOMINAL PAIN: 0
NAUSEA: 0
BLOOD IN STOOL: 0

## 2022-12-13 NOTE — PROGRESS NOTES
HPI Notes    Name: Yohana Montes  : 1995         Chief Complaint:     Chief Complaint   Patient presents with    Follow-up     Patient was coughing up blood, went to ED they were not able to find anything, Patient is taking RX as prescribed still has a rattle in his chest     Anxiety     Patient has been having random anxiety attacks and BP has been high       History of Present Illness:        HPI    The patient presents to office to follow-up for recent ER visit in [place] on 2022 for cough. His ER records available in EMR reviewed. He apparently had a viral illness about 2 to 3 weeks ago for which he has gone to [facility] ER on 11/15,  and  for complaints of cough and congestion. His COVID and influenza test were negative. Patient states that he saw some blood in his sputum which scared him and he went to [place] ER for evaluation. He did not report any other symptoms such as chest pain, fevers or chills, nausea, vomiting, diarrhea. His chest x-ray was negative and he was discharged home with Tessalon. He was noted to have elevated blood pressure 147/100. States his anxiety and depression symptoms are getting worse. States unable to focus on daily activities. He has a h/o anxiety and depression for many years. Currently on Celexa and Buspar. Has been on these meds for many years. Not in counseling. States has no financial problems, has a very supportive wife and mother. Says family noticed change in his behavior. Says he is more quiet, does not enjoy eating, socializing. He reports no suicide ideations. He would like to be referral to psychiatrist .     Has a h/o HTN for many years. States BP has been uncontrolled. Has no CP, SOB, dizziness. Currently on Losartan and Lopressor. Compliant with his medications. Reports no side effects. Says the problem is not welll controlled.          Past Medical History:     Past Medical History:   Diagnosis Date    Anxiety Depression     Kidney stone       Reviewed all health maintenance requirements and orderedappropriate tests  There are no preventive care reminders to display for this patient. Past Surgical History:     Past Surgical History:   Procedure Laterality Date    PILONIDAL CYST EXCISION      8 mos ago 2015        Medications:       Prior to Admission medications    Medication Sig Start Date End Date Taking?  Authorizing Provider   ibuprofen (ADVIL;MOTRIN) 600 MG tablet take 1 tablet by mouth every 6 hours if needed for moderate pain or mild pain 11/15/22  Yes Historical Provider, MD   ketorolac (TORADOL) 10 MG tablet Take 10 mg by mouth every 6 hours as needed 2/26/22  Yes Historical Provider, MD   ondansetron (ZOFRAN-ODT) 4 MG disintegrating tablet dissolve 1 tablet ON TONGUE every 8 hours if needed for nausea 11/15/22  Yes Historical Provider, MD   Pseudoephedrine-DM-GG 60- MG TABS Take 1 tablet by mouth every 6 hours as needed 11/22/22  Yes Historical Provider, MD   venlafaxine (EFFEXOR XR) 37.5 MG extended release capsule Take 1 capsule by mouth daily 12/13/22  Yes Eliu Milligan MD   hydroCHLOROthiazide (HYDRODIURIL) 25 MG tablet Take 1 tablet by mouth every morning 12/13/22  Yes Eliu Milligan MD   busPIRone (BUSPAR) 15 MG tablet take 1 tablet by mouth three times a day 12/1/22  Yes Eliu Milligan MD   omeprazole (PRILOSEC) 20 MG delayed release capsule take 1 capsule by mouth twice a day before meals 10/24/22  Yes Eliu Milligan MD   metoprolol tartrate (LOPRESSOR) 25 MG tablet take 1 tablet by mouth twice a day 7/25/22  Yes Eliu Milligan MD   citalopram (CELEXA) 40 MG tablet take 1 tablet by mouth once daily 6/23/22  Yes Eliu Milligan MD   losartan (COZAAR) 50 MG tablet Take 1 tablet by mouth 2 times daily 1/31/22  Yes Eliu Milligan MD   Omega-3 Fatty Acids (FISH OIL) 1200 MG CAPS Take by mouth daily   Yes Historical Provider, MD        Allergies:       Patient has no known allergies. Social History:     Tobacco: reports that he has been smoking e-cigarettes and cigarettes. He has a 2.50 pack-year smoking history. He has never used smokeless tobacco.  Alcohol:      reports current alcohol use of about 5.0 standard drinks per week. Drug Use:  reports no history of drug use. Family History:     No family history on file. Review of Systems:         Review of Systems   Constitutional:  Negative for activity change, appetite change and unexpected weight change. HENT:  Positive for congestion. Negative for ear discharge, ear pain and sore throat. Eyes:  Negative for visual disturbance. Respiratory:  Positive for cough. Negative for shortness of breath and wheezing. Cardiovascular:  Negative for chest pain, palpitations and leg swelling. Gastrointestinal:  Negative for abdominal pain, blood in stool, constipation and nausea. Endocrine: Negative for cold intolerance, heat intolerance, polydipsia and polyuria. Genitourinary:  Negative for difficulty urinating and dysuria. Musculoskeletal: Negative. Skin:  Negative for rash. Allergic/Immunologic: Negative. Neurological:  Negative for dizziness, weakness and headaches. Psychiatric/Behavioral:  Negative for behavioral problems and dysphoric mood. The patient is not nervous/anxious. Physical Exam:     Vitals:  BP (!) 133/93 (Site: Right Upper Arm, Position: Sitting)   Pulse 93   Resp 18   Ht 5' 9\" (1.753 m)   Wt 223 lb (101.2 kg)   SpO2 98%   BMI 32.93 kg/m²       Physical Exam  Vitals reviewed. Constitutional:       General: He is not in acute distress. Appearance: Normal appearance. He is well-developed. HENT:      Head: Normocephalic and atraumatic. Right Ear: Tympanic membrane, ear canal and external ear normal.      Left Ear: Tympanic membrane, ear canal and external ear normal.      Nose: Nose normal. No congestion. Mouth/Throat:      Pharynx: Oropharynx is clear.    Eyes: General:         Right eye: No discharge. Left eye: No discharge. Conjunctiva/sclera: Conjunctivae normal.   Neck:      Thyroid: No thyromegaly. Cardiovascular:      Rate and Rhythm: Normal rate and regular rhythm. Heart sounds: Normal heart sounds. No murmur heard. Pulmonary:      Effort: Pulmonary effort is normal.      Breath sounds: Normal breath sounds. No wheezing or rales. Abdominal:      General: Bowel sounds are normal. There is no distension. Palpations: Abdomen is soft. There is no mass. Tenderness: There is no abdominal tenderness. Musculoskeletal:         General: Normal range of motion. Right lower leg: No edema. Left lower leg: No edema. Lymphadenopathy:      Cervical: No cervical adenopathy. Skin:     General: Skin is warm and dry. Coloration: Skin is not pale. Findings: No rash. Neurological:      General: No focal deficit present. Mental Status: He is alert and oriented to person, place, and time. Mental status is at baseline. Psychiatric:         Mood and Affect: Mood normal.         Behavior: Behavior normal.         Thought Content:  Thought content normal.         Judgment: Judgment normal.             Data:     Lab Results   Component Value Date/Time     12/10/2020 12:00 AM    K 3.9 12/10/2020 12:00 AM     12/10/2020 12:00 AM    CO2 27 12/10/2020 12:00 AM    BUN 18 12/10/2020 12:00 AM    CREATININE 1.30 12/10/2020 12:00 AM    GLUCOSE 94 12/10/2020 12:00 AM    PROT 7.9 05/10/2015 11:00 PM    LABALBU 4.2 12/10/2020 12:00 AM    BILITOT 0.4 12/10/2020 12:00 AM    ALKPHOS 117 12/10/2020 12:00 AM    AST 38 12/10/2020 12:00 AM    ALT 60 12/10/2020 12:00 AM     Lab Results   Component Value Date/Time    WBC 10.08 12/10/2020 12:00 AM    RBC 4.67 12/10/2020 12:00 AM    HGB 14.0 12/10/2020 12:00 AM    HCT 39.9 12/10/2020 12:00 AM    MCV 85.4 12/10/2020 12:00 AM    MCH 30.0 12/10/2020 12:00 AM    MCHC 35.1 12/10/2020 12:00 AM RDW 11.9 12/10/2020 12:00 AM     12/10/2020 12:00 AM    MPV 11.6 12/10/2020 12:00 AM     Lab Results   Component Value Date/Time    TSH 1.58 12/10/2020 12:00 AM     Lab Results   Component Value Date/Time    LABA1C 5.1 05/08/2016 07:08 AM          Assessment & Plan        Diagnosis Orders   1. Anxiety   Will refer to counseling services and psychiatrist . Prescribed Effexor 37.5 mg/day. Discussed potential side effects. Follow up in 4 weeks if needed . External Referral To Counseling Services    External Referral To Psychiatry    venlafaxine (EFFEXOR XR) 37.5 MG extended release capsule      2. Depression, unspecified depression type   Patient reports no suicide ideations. Referred to counseling and psychiatrist  External Referral To Counseling Services    External Referral To Psychiatry      3. Essential hypertension   BP uncontrolled. Possibly due to Anxiety. Will add HCTZ for better BP control.  hydroCHLOROthiazide (HYDRODIURIL) 25 MG tablet      4. Subacute cough   Resolving, likely due to URI. Completed Refills   Requested Prescriptions     Signed Prescriptions Disp Refills    venlafaxine (EFFEXOR XR) 37.5 MG extended release capsule 30 capsule 3     Sig: Take 1 capsule by mouth daily    hydroCHLOROthiazide (HYDRODIURIL) 25 MG tablet 30 tablet 5     Sig: Take 1 tablet by mouth every morning     No follow-ups on file.      Orders Placed This Encounter   Medications    venlafaxine (EFFEXOR XR) 37.5 MG extended release capsule     Sig: Take 1 capsule by mouth daily     Dispense:  30 capsule     Refill:  3    hydroCHLOROthiazide (HYDRODIURIL) 25 MG tablet     Sig: Take 1 tablet by mouth every morning     Dispense:  30 tablet     Refill:  5       Orders Placed This Encounter   Procedures    External Referral To Counseling Services     Referral Priority:   Routine     Referral Type:   Eval and Treat     Referral Reason:   Specialty Services Required     Requested Specialty: Clinical Counselor     Number of Visits Requested:   1    External Referral To Psychiatry     Referral Priority:   Routine     Referral Type:   Eval and Treat     Referral Reason:   Specialty Services Required     Referred to Provider:   Thuy Slaughter MD     Requested Specialty:   Psychiatry     Number of Visits Requested:   1           There are no Patient Instructions on file for this visit.     Electronically signed by Luis Renteria MD on 12/13/2022 at 9:47 AM           Completed Refills      Requested Prescriptions     Signed Prescriptions Disp Refills    venlafaxine (EFFEXOR XR) 37.5 MG extended release capsule 30 capsule 3     Sig: Take 1 capsule by mouth daily    hydroCHLOROthiazide (HYDRODIURIL) 25 MG tablet 30 tablet 5     Sig: Take 1 tablet by mouth every morning

## 2023-01-16 ENCOUNTER — OFFICE VISIT (OUTPATIENT)
Dept: FAMILY MEDICINE CLINIC | Age: 28
End: 2023-01-16
Payer: MEDICAID

## 2023-01-16 VITALS
DIASTOLIC BLOOD PRESSURE: 88 MMHG | SYSTOLIC BLOOD PRESSURE: 134 MMHG | HEART RATE: 120 BPM | RESPIRATION RATE: 18 BRPM | HEIGHT: 69 IN | WEIGHT: 228.6 LBS | BODY MASS INDEX: 33.86 KG/M2 | OXYGEN SATURATION: 97 %

## 2023-01-16 DIAGNOSIS — I10 ESSENTIAL HYPERTENSION: ICD-10-CM

## 2023-01-16 DIAGNOSIS — L03.313 CELLULITIS OF CHEST WALL: Primary | ICD-10-CM

## 2023-01-16 DIAGNOSIS — F32.A DEPRESSION, UNSPECIFIED DEPRESSION TYPE: ICD-10-CM

## 2023-01-16 PROCEDURE — G8427 DOCREV CUR MEDS BY ELIG CLIN: HCPCS | Performed by: INTERNAL MEDICINE

## 2023-01-16 PROCEDURE — 3079F DIAST BP 80-89 MM HG: CPT | Performed by: INTERNAL MEDICINE

## 2023-01-16 PROCEDURE — 99214 OFFICE O/P EST MOD 30 MIN: CPT | Performed by: INTERNAL MEDICINE

## 2023-01-16 PROCEDURE — G8484 FLU IMMUNIZE NO ADMIN: HCPCS | Performed by: INTERNAL MEDICINE

## 2023-01-16 PROCEDURE — G8417 CALC BMI ABV UP PARAM F/U: HCPCS | Performed by: INTERNAL MEDICINE

## 2023-01-16 PROCEDURE — 3075F SYST BP GE 130 - 139MM HG: CPT | Performed by: INTERNAL MEDICINE

## 2023-01-16 PROCEDURE — 4004F PT TOBACCO SCREEN RCVD TLK: CPT | Performed by: INTERNAL MEDICINE

## 2023-01-16 RX ORDER — LEVOFLOXACIN 500 MG/1
500 TABLET, FILM COATED ORAL DAILY
Qty: 7 TABLET | Refills: 0 | Status: SHIPPED | OUTPATIENT
Start: 2023-01-16 | End: 2023-01-23

## 2023-01-16 RX ORDER — DOXYCYCLINE HYCLATE 100 MG/1
100 CAPSULE ORAL 2 TIMES DAILY
Qty: 14 CAPSULE | Refills: 0 | Status: SHIPPED | OUTPATIENT
Start: 2023-01-16 | End: 2023-01-23

## 2023-01-16 ASSESSMENT — ENCOUNTER SYMPTOMS
WHEEZING: 0
SORE THROAT: 0
SHORTNESS OF BREATH: 0
ALLERGIC/IMMUNOLOGIC NEGATIVE: 1
ORTHOPNEA: 0
ABDOMINAL PAIN: 0
COUGH: 0
CONSTIPATION: 0
BLOOD IN STOOL: 0
NAUSEA: 0

## 2023-01-16 ASSESSMENT — PATIENT HEALTH QUESTIONNAIRE - PHQ9
6. FEELING BAD ABOUT YOURSELF - OR THAT YOU ARE A FAILURE OR HAVE LET YOURSELF OR YOUR FAMILY DOWN: 0
9. THOUGHTS THAT YOU WOULD BE BETTER OFF DEAD, OR OF HURTING YOURSELF: 0
8. MOVING OR SPEAKING SO SLOWLY THAT OTHER PEOPLE COULD HAVE NOTICED. OR THE OPPOSITE, BEING SO FIGETY OR RESTLESS THAT YOU HAVE BEEN MOVING AROUND A LOT MORE THAN USUAL: 0
SUM OF ALL RESPONSES TO PHQ QUESTIONS 1-9: 0
5. POOR APPETITE OR OVEREATING: 0
1. LITTLE INTEREST OR PLEASURE IN DOING THINGS: 0
SUM OF ALL RESPONSES TO PHQ9 QUESTIONS 1 & 2: 0
3. TROUBLE FALLING OR STAYING ASLEEP: 0
SUM OF ALL RESPONSES TO PHQ QUESTIONS 1-9: 0
10. IF YOU CHECKED OFF ANY PROBLEMS, HOW DIFFICULT HAVE THESE PROBLEMS MADE IT FOR YOU TO DO YOUR WORK, TAKE CARE OF THINGS AT HOME, OR GET ALONG WITH OTHER PEOPLE: 0
SUM OF ALL RESPONSES TO PHQ QUESTIONS 1-9: 0
2. FEELING DOWN, DEPRESSED OR HOPELESS: 0
4. FEELING TIRED OR HAVING LITTLE ENERGY: 0
SUM OF ALL RESPONSES TO PHQ QUESTIONS 1-9: 0
7. TROUBLE CONCENTRATING ON THINGS, SUCH AS READING THE NEWSPAPER OR WATCHING TELEVISION: 0

## 2023-01-16 NOTE — PROGRESS NOTES
HPI Notes    Name: Patel Vasquez  : 1995         Chief Complaint:     Chief Complaint   Patient presents with    Follow-up     Patient states he deals with nausea for about the first hour every morning and then once it passes he's ok for the day. Patient thinks he as an abscess under left arm pit and it is painful. History of Present Illness:        Sulma Ybarra resents to follow-up for his depression/anxiety and uncontrolled hypertension. He also c/o red swollen and painful area on the left side below his armpit. The problem started about 3 days ago with a small red pimple. He did not squeeze it . The next day it doubled in size. He applied warm compresses but it did not help. Says had abscess in the past that had to be drained . He reports no fever or chills. The pain is constant, pulsating, 7-8/10      During his last office visit we started him on Effexor 37.5 mg daily for his worsening Anxiety and depression. He is also on BuSpar and Celexa. He was referred to counseling and psychiatrist. He says since starting Effexor his symptoms significantly improved. He is going to follow up with a psychiatrist in [place]. He reports no suicidal ideations. Hypertension  This is a chronic problem. The current episode started more than 1 year ago. The problem has been gradually improving since onset. The problem is controlled. Pertinent negatives include no chest pain, headaches, orthopnea, palpitations, peripheral edema or shortness of breath. There are no associated agents to hypertension. Risk factors for coronary artery disease include male gender and obesity. Past treatments include beta blockers, angiotensin blockers and diuretics. The current treatment provides significant improvement. There are no compliance problems. There is no history of kidney disease, CAD/MI or CVA.          Past Medical History:     Past Medical History:   Diagnosis Date    Anxiety     Depression     Kidney stone Reviewed all health maintenance requirements and orderedappropriate tests  There are no preventive care reminders to display for this patient. Past Surgical History:     Past Surgical History:   Procedure Laterality Date    PILONIDAL CYST EXCISION      8 mos ago 2015        Medications:       Prior to Admission medications    Medication Sig Start Date End Date Taking?  Authorizing Provider   doxycycline hyclate (VIBRAMYCIN) 100 MG capsule Take 1 capsule by mouth 2 times daily for 7 days 1/16/23 1/23/23 Yes Danitza Garcia MD   levoFLOXacin (LEVAQUIN) 500 MG tablet Take 1 tablet by mouth daily for 7 days 1/16/23 1/23/23 Yes Danitza Garcia MD   ibuprofen (ADVIL;MOTRIN) 600 MG tablet take 1 tablet by mouth every 6 hours if needed for moderate pain or mild pain 11/15/22  Yes Historical Provider, MD   ketorolac (TORADOL) 10 MG tablet Take 10 mg by mouth every 6 hours as needed 2/26/22  Yes Historical Provider, MD   ondansetron (ZOFRAN-ODT) 4 MG disintegrating tablet dissolve 1 tablet ON TONGUE every 8 hours if needed for nausea 11/15/22  Yes Historical Provider, MD   Pseudoephedrine-DM-GG 60- MG TABS Take 1 tablet by mouth every 6 hours as needed 11/22/22  Yes Historical Provider, MD   venlafaxine (EFFEXOR XR) 37.5 MG extended release capsule Take 1 capsule by mouth daily 12/13/22  Yes Danitza Garcia MD   hydroCHLOROthiazide (HYDRODIURIL) 25 MG tablet Take 1 tablet by mouth every morning 12/13/22  Yes Danitza Garcia MD   busPIRone (BUSPAR) 15 MG tablet take 1 tablet by mouth three times a day 12/1/22  Yes Danitza Garcia MD   omeprazole (PRILOSEC) 20 MG delayed release capsule take 1 capsule by mouth twice a day before meals 10/24/22  Yes Danitza Garcia MD   metoprolol tartrate (LOPRESSOR) 25 MG tablet take 1 tablet by mouth twice a day 7/25/22  Yes Danitza Garcia MD   citalopram (CELEXA) 40 MG tablet take 1 tablet by mouth once daily 6/23/22  Yes Danitza Garcia MD   losartan (COZAAR) 50 MG tablet Take 1 tablet by mouth 2 times daily 1/31/22  Yes Fernando Rodriguez MD   Omega-3 Fatty Acids (FISH OIL) 1200 MG CAPS Take by mouth daily   Yes Historical Provider, MD        Allergies:       Patient has no known allergies.    Social History:     Tobacco: reports that he has been smoking e-cigarettes and cigarettes. He has a 2.50 pack-year smoking history. He has never used smokeless tobacco.  Alcohol:      reports current alcohol use of about 5.0 standard drinks per week.  Drug Use:  reports no history of drug use.    Family History:     No family history on file.    Review of Systems:         Review of Systems   Constitutional:  Negative for activity change, appetite change and unexpected weight change.   HENT:  Negative for congestion, ear discharge, ear pain and sore throat.    Eyes:  Negative for visual disturbance.   Respiratory:  Negative for cough, shortness of breath and wheezing.    Cardiovascular:  Negative for chest pain, palpitations, orthopnea and leg swelling.   Gastrointestinal:  Negative for abdominal pain, blood in stool, constipation and nausea.   Endocrine: Negative for cold intolerance, heat intolerance, polydipsia and polyuria.   Genitourinary:  Negative for difficulty urinating and dysuria.   Musculoskeletal: Negative.    Skin:  Positive for rash.   Allergic/Immunologic: Negative.    Neurological:  Negative for weakness and headaches.   Psychiatric/Behavioral:  Negative for behavioral problems, dysphoric mood, self-injury, sleep disturbance and suicidal ideas. The patient is not nervous/anxious.        Physical Exam:     Vitals:  /88 (Site: Right Upper Arm, Position: Sitting)   Pulse (!) 120   Resp 18   Ht 5' 9\" (1.753 m)   Wt 228 lb 9.6 oz (103.7 kg)   SpO2 97%   BMI 33.76 kg/m²       Physical Exam  Vitals reviewed.   Constitutional:       General: He is not in acute distress.     Appearance: Normal appearance. He is well-developed. He is not ill-appearing.   HENT:      Head:  Normocephalic and atraumatic. Right Ear: External ear normal.      Left Ear: External ear normal.   Neck:      Thyroid: No thyromegaly. Cardiovascular:      Rate and Rhythm: Normal rate and regular rhythm. Heart sounds: Normal heart sounds. No murmur heard. Pulmonary:      Effort: Pulmonary effort is normal.      Breath sounds: Normal breath sounds. No wheezing or rales. Abdominal:      General: Bowel sounds are normal. There is no distension. Palpations: Abdomen is soft. There is no mass. Tenderness: There is no abdominal tenderness. Musculoskeletal:         General: Normal range of motion. Right lower leg: No edema. Left lower leg: No edema. Lymphadenopathy:      Cervical: No cervical adenopathy. Skin:     General: Skin is warm and dry. Coloration: Skin is not jaundiced or pale. Findings: Erythema (left side below his armpit noted to have an area of induration 5 x 5 cm, surrounding skin redness, area is warm and tender to touch, no drainage or open wounds present) present. No rash. Neurological:      Mental Status: He is alert and oriented to person, place, and time.    Psychiatric:         Behavior: Behavior normal.         Judgment: Judgment normal.             Data:     Lab Results   Component Value Date/Time     12/10/2020 12:00 AM    K 3.9 12/10/2020 12:00 AM     12/10/2020 12:00 AM    CO2 27 12/10/2020 12:00 AM    BUN 18 12/10/2020 12:00 AM    CREATININE 1.30 12/10/2020 12:00 AM    GLUCOSE 94 12/10/2020 12:00 AM    PROT 7.9 05/10/2015 11:00 PM    LABALBU 4.2 12/10/2020 12:00 AM    BILITOT 0.4 12/10/2020 12:00 AM    ALKPHOS 117 12/10/2020 12:00 AM    AST 38 12/10/2020 12:00 AM    ALT 60 12/10/2020 12:00 AM     Lab Results   Component Value Date/Time    WBC 10.08 12/10/2020 12:00 AM    RBC 4.67 12/10/2020 12:00 AM    HGB 14.0 12/10/2020 12:00 AM    HCT 39.9 12/10/2020 12:00 AM    MCV 85.4 12/10/2020 12:00 AM    MCH 30.0 12/10/2020 12:00 AM    MCHC 35.1 12/10/2020 12:00 AM    RDW 11.9 12/10/2020 12:00 AM     12/10/2020 12:00 AM    MPV 11.6 12/10/2020 12:00 AM     Lab Results   Component Value Date/Time    TSH 1.58 12/10/2020 12:00 AM     Lab Results   Component Value Date/Time    LABA1C 5.1 05/08/2016 07:08 AM          Assessment & Plan        Diagnosis Orders   1. Cellulitis of chest wall   Patient with cellulitis of his chest wall with possible evolving abscess.  Prescribed doxycycline and Levaquin course.  Advised to apply warm compresses.  He is taking ibuprofen for pain he does not look toxic during his examination today.  He strongly advised to go to the emergency room if condition does not resolve with antibiotics doxycycline hyclate (VIBRAMYCIN) 100 MG capsule    levoFLOXacin (LEVAQUIN) 500 MG tablet      2. Depression, unspecified depression type   Symptoms of depression and anxiety improved after adding Effexor 37.5 mg daily.  Patient reports that he is going to see psychiatrist in [place] this month       3. Essential hypertension   Blood pressure improved, continue on current medications.  Follow-up in 6 months                       Completed Refills   Requested Prescriptions     Signed Prescriptions Disp Refills    doxycycline hyclate (VIBRAMYCIN) 100 MG capsule 14 capsule 0     Sig: Take 1 capsule by mouth 2 times daily for 7 days    levoFLOXacin (LEVAQUIN) 500 MG tablet 7 tablet 0     Sig: Take 1 tablet by mouth daily for 7 days     Return for HTN, anxiety.     Orders Placed This Encounter   Medications    doxycycline hyclate (VIBRAMYCIN) 100 MG capsule     Sig: Take 1 capsule by mouth 2 times daily for 7 days     Dispense:  14 capsule     Refill:  0    levoFLOXacin (LEVAQUIN) 500 MG tablet     Sig: Take 1 tablet by mouth daily for 7 days     Dispense:  7 tablet     Refill:  0       No orders of the defined types were placed in this encounter.        There are no Patient Instructions on file for this visit.    Electronically signed by  Julio Sykes MD on 1/16/2023 at 10:52 AM           Completed Refills      Requested Prescriptions     Signed Prescriptions Disp Refills    doxycycline hyclate (VIBRAMYCIN) 100 MG capsule 14 capsule 0     Sig: Take 1 capsule by mouth 2 times daily for 7 days    levoFLOXacin (LEVAQUIN) 500 MG tablet 7 tablet 0     Sig: Take 1 tablet by mouth daily for 7 days

## 2023-01-18 DIAGNOSIS — I10 ESSENTIAL HYPERTENSION: ICD-10-CM

## 2023-01-18 RX ORDER — LOSARTAN POTASSIUM 50 MG/1
TABLET ORAL
Qty: 60 TABLET | Refills: 5 | Status: SHIPPED | OUTPATIENT
Start: 2023-01-18

## 2023-01-30 DIAGNOSIS — I10 ESSENTIAL HYPERTENSION: ICD-10-CM

## 2023-03-02 LAB
ALANINE AMINOTRANSFERASE (SGPT) (U/L) IN SER/PLAS: 27 U/L (ref 10–52)
ALBUMIN (G/DL) IN SER/PLAS: 4.4 G/DL (ref 3.4–5)
ALKALINE PHOSPHATASE (U/L) IN SER/PLAS: 85 U/L (ref 33–120)
ANION GAP IN SER/PLAS: 11 MMOL/L (ref 10–20)
ASPARTATE AMINOTRANSFERASE (SGOT) (U/L) IN SER/PLAS: 23 U/L (ref 9–39)
BASOPHILS (10*3/UL) IN BLOOD BY AUTOMATED COUNT: 0.06 X10E9/L (ref 0–0.1)
BASOPHILS/100 LEUKOCYTES IN BLOOD BY AUTOMATED COUNT: 0.6 % (ref 0–2)
BILIRUBIN TOTAL (MG/DL) IN SER/PLAS: 0.2 MG/DL (ref 0–1.2)
CALCIUM (MG/DL) IN SER/PLAS: 9.2 MG/DL (ref 8.6–10.3)
CARBON DIOXIDE, TOTAL (MMOL/L) IN SER/PLAS: 29 MMOL/L (ref 21–32)
CHLORIDE (MMOL/L) IN SER/PLAS: 104 MMOL/L (ref 98–107)
CREATININE (MG/DL) IN SER/PLAS: 0.91 MG/DL (ref 0.5–1.3)
EOSINOPHILS (10*3/UL) IN BLOOD BY AUTOMATED COUNT: 0.3 X10E9/L (ref 0–0.7)
EOSINOPHILS/100 LEUKOCYTES IN BLOOD BY AUTOMATED COUNT: 3.1 % (ref 0–6)
ERYTHROCYTE DISTRIBUTION WIDTH (RATIO) BY AUTOMATED COUNT: 12.8 % (ref 11.5–14.5)
ERYTHROCYTE MEAN CORPUSCULAR HEMOGLOBIN CONCENTRATION (G/DL) BY AUTOMATED: 31.9 G/DL (ref 32–36)
ERYTHROCYTE MEAN CORPUSCULAR VOLUME (FL) BY AUTOMATED COUNT: 92 FL (ref 80–100)
ERYTHROCYTES (10*6/UL) IN BLOOD BY AUTOMATED COUNT: 4.66 X10E12/L (ref 4.5–5.9)
GFR MALE: >90 ML/MIN/1.73M2
GLUCOSE (MG/DL) IN SER/PLAS: 86 MG/DL (ref 74–99)
HEMATOCRIT (%) IN BLOOD BY AUTOMATED COUNT: 42.9 % (ref 41–52)
HEMOGLOBIN (G/DL) IN BLOOD: 13.7 G/DL (ref 13.5–17.5)
IMMATURE GRANULOCYTES/100 LEUKOCYTES IN BLOOD BY AUTOMATED COUNT: 0.6 % (ref 0–0.9)
LEUKOCYTES (10*3/UL) IN BLOOD BY AUTOMATED COUNT: 9.6 X10E9/L (ref 4.4–11.3)
LYMPHOCYTES (10*3/UL) IN BLOOD BY AUTOMATED COUNT: 3.3 X10E9/L (ref 1.2–4.8)
LYMPHOCYTES/100 LEUKOCYTES IN BLOOD BY AUTOMATED COUNT: 34.5 % (ref 13–44)
MONOCYTES (10*3/UL) IN BLOOD BY AUTOMATED COUNT: 0.85 X10E9/L (ref 0.1–1)
MONOCYTES/100 LEUKOCYTES IN BLOOD BY AUTOMATED COUNT: 8.9 % (ref 2–10)
NEUTROPHILS (10*3/UL) IN BLOOD BY AUTOMATED COUNT: 5 X10E9/L (ref 1.2–7.7)
NEUTROPHILS/100 LEUKOCYTES IN BLOOD BY AUTOMATED COUNT: 52.3 % (ref 40–80)
PLATELETS (10*3/UL) IN BLOOD AUTOMATED COUNT: 297 X10E9/L (ref 150–450)
POTASSIUM (MMOL/L) IN SER/PLAS: 3.9 MMOL/L (ref 3.5–5.3)
PROTEIN TOTAL: 7.3 G/DL (ref 6.4–8.2)
SODIUM (MMOL/L) IN SER/PLAS: 140 MMOL/L (ref 136–145)
UREA NITROGEN (MG/DL) IN SER/PLAS: 16 MG/DL (ref 6–23)

## 2023-03-07 DIAGNOSIS — F41.9 ANXIETY: ICD-10-CM

## 2023-03-07 RX ORDER — BUSPIRONE HYDROCHLORIDE 15 MG/1
TABLET ORAL
Qty: 90 TABLET | Refills: 2 | Status: SHIPPED | OUTPATIENT
Start: 2023-03-07

## 2023-03-22 LAB — C. DIFFICILE TOXIN, PCR: NOT DETECTED

## 2023-03-23 LAB
CAMPYLOBACTER GP: NOT DETECTED
NOROVIRUS GI/GII: NOT DETECTED
ROTAVIRUS A: NOT DETECTED
SALMONELLA SP.: NOT DETECTED
SHIGA TOXIN 1: NOT DETECTED
SHIGA TOXIN 2: NOT DETECTED
SHIGELLA SP.: NOT DETECTED
VIBRIO GRP.: NOT DETECTED
YERSINIA ENTEROCOLITICA: NOT DETECTED

## 2023-04-06 DIAGNOSIS — F41.9 ANXIETY: ICD-10-CM

## 2023-04-06 RX ORDER — CITALOPRAM 40 MG/1
TABLET ORAL
Qty: 90 TABLET | Refills: 2 | Status: SHIPPED | OUTPATIENT
Start: 2023-04-06

## 2023-04-27 LAB
GRAM STAIN: NORMAL
TISSUE/WOUND CULTURE/SMEAR: NORMAL

## 2023-08-03 DIAGNOSIS — I10 ESSENTIAL HYPERTENSION: ICD-10-CM
